# Patient Record
Sex: MALE | Race: WHITE | NOT HISPANIC OR LATINO | Employment: OTHER | URBAN - METROPOLITAN AREA
[De-identification: names, ages, dates, MRNs, and addresses within clinical notes are randomized per-mention and may not be internally consistent; named-entity substitution may affect disease eponyms.]

---

## 2020-11-06 ENCOUNTER — APPOINTMENT (OUTPATIENT)
Dept: GENERAL RADIOLOGY | Facility: CLINIC | Age: 25
End: 2020-11-06
Attending: EMERGENCY MEDICINE

## 2020-11-06 ENCOUNTER — HOSPITAL ENCOUNTER (EMERGENCY)
Facility: CLINIC | Age: 25
Discharge: ANOTHER HOSPITAL | End: 2020-11-06
Attending: EMERGENCY MEDICINE | Admitting: EMERGENCY MEDICINE

## 2020-11-06 VITALS
DIASTOLIC BLOOD PRESSURE: 119 MMHG | HEART RATE: 161 BPM | TEMPERATURE: 99.2 F | SYSTOLIC BLOOD PRESSURE: 169 MMHG | OXYGEN SATURATION: 98 %

## 2020-11-06 DIAGNOSIS — S11.93XA GUNSHOT WOUND OF NECK, INITIAL ENCOUNTER: ICD-10-CM

## 2020-11-06 LAB
ABO + RH BLD: NORMAL
ABO + RH BLD: NORMAL
ALBUMIN SERPL-MCNC: 3.3 G/DL (ref 3.4–5)
ALP SERPL-CCNC: 87 U/L (ref 40–150)
ALT SERPL W P-5'-P-CCNC: 24 U/L (ref 0–70)
ANION GAP SERPL CALCULATED.3IONS-SCNC: 13 MMOL/L (ref 3–14)
APTT PPP: 42 SEC (ref 22–37)
AST SERPL W P-5'-P-CCNC: 26 U/L (ref 0–45)
BILIRUB SERPL-MCNC: 1.6 MG/DL (ref 0.2–1.3)
BLD GP AB SCN SERPL QL: NORMAL
BLD PROD TYP BPU: NORMAL
BLD UNIT ID BPU: 0
BLOOD BANK CMNT PATIENT-IMP: NORMAL
BLOOD PRODUCT CODE: NORMAL
BPU ID: NORMAL
BUN SERPL-MCNC: 14 MG/DL (ref 7–30)
CALCIUM SERPL-MCNC: 9.1 MG/DL (ref 8.5–10.1)
CHLORIDE SERPL-SCNC: 100 MMOL/L (ref 94–109)
CO2 SERPL-SCNC: 25 MMOL/L (ref 20–32)
CREAT SERPL-MCNC: 0.97 MG/DL (ref 0.66–1.25)
ETHANOL SERPL-MCNC: <0.01 G/DL
GFR SERPL CREATININE-BSD FRML MDRD: >90 ML/MIN/{1.73_M2}
GLUCOSE SERPL-MCNC: 202 MG/DL (ref 70–99)
INR PPP: 1.05 (ref 0.86–1.14)
LACTATE BLD-SCNC: 6.4 MMOL/L (ref 0.7–2)
NUM BPU REQUESTED: 1
NUM BPU REQUESTED: 2
NUM BPU REQUESTED: 6
POTASSIUM SERPL-SCNC: 3.1 MMOL/L (ref 3.4–5.3)
PROT SERPL-MCNC: 6.8 G/DL (ref 6.8–8.8)
SODIUM SERPL-SCNC: 138 MMOL/L (ref 133–144)
SPECIMEN EXP DATE BLD: NORMAL
TRANSFUSION STATUS PATIENT QL: NORMAL

## 2020-11-06 PROCEDURE — 31605 EMER TRACHEOSTOMY CTHYR MEM: CPT

## 2020-11-06 PROCEDURE — 86923 COMPATIBILITY TEST ELECTRIC: CPT | Performed by: EMERGENCY MEDICINE

## 2020-11-06 PROCEDURE — 85025 COMPLETE CBC W/AUTO DIFF WBC: CPT | Performed by: EMERGENCY MEDICINE

## 2020-11-06 PROCEDURE — 999N000065 XR CHEST PORT 1 VW

## 2020-11-06 PROCEDURE — 85730 THROMBOPLASTIN TIME PARTIAL: CPT | Performed by: EMERGENCY MEDICINE

## 2020-11-06 PROCEDURE — 86850 RBC ANTIBODY SCREEN: CPT | Performed by: EMERGENCY MEDICINE

## 2020-11-06 PROCEDURE — 36430 TRANSFUSION BLD/BLD COMPNT: CPT

## 2020-11-06 PROCEDURE — 86900 BLOOD TYPING SEROLOGIC ABO: CPT | Performed by: EMERGENCY MEDICINE

## 2020-11-06 PROCEDURE — 99204 OFFICE O/P NEW MOD 45 MIN: CPT | Performed by: SURGERY

## 2020-11-06 PROCEDURE — 31500 INSERT EMERGENCY AIRWAY: CPT

## 2020-11-06 PROCEDURE — 85610 PROTHROMBIN TIME: CPT | Performed by: EMERGENCY MEDICINE

## 2020-11-06 PROCEDURE — P9059 PLASMA, FRZ BETWEEN 8-24HOUR: HCPCS | Performed by: EMERGENCY MEDICINE

## 2020-11-06 PROCEDURE — 80053 COMPREHEN METABOLIC PANEL: CPT | Performed by: EMERGENCY MEDICINE

## 2020-11-06 PROCEDURE — 250N000011 HC RX IP 250 OP 636: Performed by: EMERGENCY MEDICINE

## 2020-11-06 PROCEDURE — 250N000011 HC RX IP 250 OP 636

## 2020-11-06 PROCEDURE — 999N001063 HC STATISTIC THAWING COMPONENT: Performed by: EMERGENCY MEDICINE

## 2020-11-06 PROCEDURE — 96365 THER/PROPH/DIAG IV INF INIT: CPT

## 2020-11-06 PROCEDURE — 83605 ASSAY OF LACTIC ACID: CPT | Performed by: EMERGENCY MEDICINE

## 2020-11-06 PROCEDURE — P9037 PLATE PHERES LEUKOREDU IRRAD: HCPCS | Performed by: EMERGENCY MEDICINE

## 2020-11-06 PROCEDURE — 99291 CRITICAL CARE FIRST HOUR: CPT | Mod: 25

## 2020-11-06 PROCEDURE — P9016 RBC LEUKOCYTES REDUCED: HCPCS | Performed by: EMERGENCY MEDICINE

## 2020-11-06 PROCEDURE — 999N000186 HC STATISTIC TRAUMA - NO PRIOR

## 2020-11-06 PROCEDURE — 86901 BLOOD TYPING SEROLOGIC RH(D): CPT | Performed by: EMERGENCY MEDICINE

## 2020-11-06 PROCEDURE — 80320 DRUG SCREEN QUANTALCOHOLS: CPT | Performed by: EMERGENCY MEDICINE

## 2020-11-06 PROCEDURE — 250N000009 HC RX 250: Performed by: EMERGENCY MEDICINE

## 2020-11-06 RX ORDER — PROPOFOL 10 MG/ML
5-75 INJECTION, EMULSION INTRAVENOUS CONTINUOUS
Status: DISCONTINUED | OUTPATIENT
Start: 2020-11-06 | End: 2020-11-06 | Stop reason: HOSPADM

## 2020-11-06 RX ORDER — PROPOFOL 10 MG/ML
INJECTION, EMULSION INTRAVENOUS
Status: COMPLETED
Start: 2020-11-06 | End: 2020-11-06

## 2020-11-06 RX ORDER — ROCURONIUM BROMIDE 50 MG/5 ML
70 SYRINGE (ML) INTRAVENOUS ONCE
Status: COMPLETED | OUTPATIENT
Start: 2020-11-06 | End: 2020-11-06

## 2020-11-06 RX ADMIN — PROPOFOL 30 MCG/KG/MIN: 10 INJECTION, EMULSION INTRAVENOUS at 18:40

## 2020-11-06 RX ADMIN — Medication 100 MG: at 18:21

## 2020-11-06 RX ADMIN — Medication 70 MG: at 18:40

## 2020-11-06 RX ADMIN — KETAMINE HYDROCHLORIDE 100 MG: 10 INJECTION, SOLUTION INTRAMUSCULAR; INTRAVENOUS at 18:28

## 2020-11-06 RX ADMIN — Medication 100 MG: at 18:14

## 2020-11-06 RX ADMIN — SUCCINYLCHOLINE CHLORIDE 100 MG: 20 INJECTION, SOLUTION INTRAMUSCULAR; INTRAVENOUS at 18:58

## 2020-11-06 ASSESSMENT — ENCOUNTER SYMPTOMS
SHORTNESS OF BREATH: 0
HEADACHES: 0
WOUND: 1
ABDOMINAL PAIN: 0

## 2020-11-07 NOTE — ED PROVIDER NOTES
History     Chief Complaint:  No chief complaint on file.      KAREN Yoder is a 25 year old male who presents with ***    Allergies:  No Known Drug Allergies    Medications:    The patient is not currently taking any prescribed medications.    Past Medical History:    Depression with suicidal ideation  Suicide attempt by drug ingestion  Conduct disorder  Substance abuse  Psychoactive substance-induced organic mood disorder    Past Surgical History:    Tonsillectomy    Family History:    History reviewed. No pertinent family history.     Social History:  The patient was accompanied to the ED by EMS.  Smoking Status: Former smoker  Smokeless Tobacco: Never used  Alcohol Use: Yes  Drug Use: Yes - marijuana  Marital Status:  Single    Review of Systems  ***  Physical Exam     Patient Vitals for the past 24 hrs:   BP Pulse SpO2   11/06/20 1810 -- -- 100 %   11/06/20 1809 -- -- 100 %   11/06/20 1808 (!) 122/96 139 100 %       Physical Exam  ***    Emergency Department Course     ECG:  ***     Imaging:  Radiology findings were communicated with the {Patient/MD:[id number]} who voiced understanding of the findings.    No orders to display       Laboratory:  Laboratory findings were communicated with the {Patient/MD:[id number]} who voiced understanding of the findings.    ***     Procedures  ***    Interventions:  ***    Medications   propofol (DIPRIVAN) 1000 MG/100ML infusion (has no administration in time range)       Emergency Department Course:  Past medical records, nursing notes, and vitals reviewed.    *** I performed an exam of the patient as documented above.     ***EKG obtained in the ED, see results above.   ***IV was inserted and blood was drawn for laboratory testing, results above.  ***The patient provided a urine sample here in the emergency department. This was sent for laboratory testing, findings above.  ***The patient was sent for a *** while in the emergency department, results above.     *** I  "rechecked the patient and discussed the results of his workup thus far.     {edcdispo:003529}    I personally reviewed the laboratory *** and imaging results with the {PATIENT, FAMILY MEMBER, CAREGIVER:[id number]} and answered all related questions prior to {kldischadmittrans:391997}.     Impression & Plan     ***     {[facility] Quality Projects:[id number]}    {trauma activation?:294517::\" \"}    CMS Diagnoses: {Sepsis/Septic Shock/Stemi/Stroke:025700::\" \"}    Medical Decision Making:  ***     Critical Care Time: was *** minutes for this patient excluding procedures    Diagnosis:  No diagnosis found.    Disposition:  {kldisposition:164798}    Discharge Medications:  New Prescriptions    No medications on file       ***  11/6/2020   [facility] EMERGENCY DEPT    "
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
## 2020-11-07 NOTE — ED NOTES
Assisted PT. Onto bed in triage. Brought Pt. Back to ER room. Applied monitoring equipment onto Patient (Pulse ox and BP).
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
## 2020-11-09 LAB
BASOPHILS # BLD AUTO: 0.1 10E9/L (ref 0–0.2)
BASOPHILS NFR BLD AUTO: 0.6 %
DIFFERENTIAL METHOD BLD: ABNORMAL
EOSINOPHIL # BLD AUTO: 0.3 10E9/L (ref 0–0.7)
EOSINOPHIL NFR BLD AUTO: 2.3 %
ERYTHROCYTE [DISTWIDTH] IN BLOOD BY AUTOMATED COUNT: 12 % (ref 10–15)
HCT VFR BLD AUTO: 38.3 % (ref 40–53)
HGB BLD-MCNC: 11.9 G/DL (ref 13.3–17.7)
IMM GRANULOCYTES # BLD: 0.1 10E9/L (ref 0–0.4)
IMM GRANULOCYTES NFR BLD: 0.4 %
LYMPHOCYTES # BLD AUTO: 6 10E9/L (ref 0.8–5.3)
LYMPHOCYTES NFR BLD AUTO: 48.7 %
MCH RBC QN AUTO: 28.4 PG (ref 26.5–33)
MCHC RBC AUTO-ENTMCNC: 31.1 G/DL (ref 31.5–36.5)
MCV RBC AUTO: 91 FL (ref 78–100)
MONOCYTES # BLD AUTO: 0.8 10E9/L (ref 0–1.3)
MONOCYTES NFR BLD AUTO: 6.4 %
NEUTROPHILS # BLD AUTO: 5.1 10E9/L (ref 1.6–8.3)
NEUTROPHILS NFR BLD AUTO: 41.6 %
NRBC # BLD AUTO: 0 10*3/UL
NRBC BLD AUTO-RTO: 0 /100
PLATELET # BLD AUTO: 276 10E9/L (ref 150–450)
PLATELET # BLD EST: ABNORMAL 10*3/UL
RBC # BLD AUTO: 4.19 10E12/L (ref 4.4–5.9)
RBC MORPH BLD: ABNORMAL
WBC # BLD AUTO: 12.2 10E9/L (ref 4–11)

## 2021-07-17 ENCOUNTER — HOSPITAL ENCOUNTER (INPATIENT)
Facility: CLINIC | Age: 26
LOS: 3 days | Discharge: LEFT BEFORE DISCHARGE | End: 2021-07-20
Attending: INTERNAL MEDICINE | Admitting: INTERNAL MEDICINE
Payer: COMMERCIAL

## 2021-07-17 ENCOUNTER — TRANSFERRED RECORDS (OUTPATIENT)
Dept: HEALTH INFORMATION MANAGEMENT | Facility: CLINIC | Age: 26
End: 2021-07-17

## 2021-07-17 PROBLEM — T40.2X1A OPIOID OVERDOSE (H): Status: ACTIVE | Noted: 2021-07-17

## 2021-07-17 LAB
ALT SERPL-CCNC: 22 U/L (ref 14–63)
AST SERPL-CCNC: 19 U/L (ref 15–37)
CREAT SERPL-MCNC: 1.07 MG/DL (ref 0.66–1.25)
CREATININE (EXTERNAL): 1.16 MG/DL (ref 0.67–1.17)
GFR ESTIMATED (EXTERNAL): >60 ML/MIN/1.73M2
GFR SERPL CREATININE-BSD FRML MDRD: >90 ML/MIN/1.73M2
GLUCOSE (EXTERNAL): 75 MG/DL (ref 74–100)
GLUCOSE BLDC GLUCOMTR-MCNC: 84 MG/DL (ref 70–99)
POTASSIUM (EXTERNAL): 2.8 MMOL/L (ref 3.5–5.1)
POTASSIUM BLD-SCNC: 3.5 MMOL/L (ref 3.4–5.3)

## 2021-07-17 PROCEDURE — 3E033XZ INTRODUCTION OF VASOPRESSOR INTO PERIPHERAL VEIN, PERCUTANEOUS APPROACH: ICD-10-PCS | Performed by: INTERNAL MEDICINE

## 2021-07-17 PROCEDURE — 258N000003 HC RX IP 258 OP 636: Performed by: INTERNAL MEDICINE

## 2021-07-17 PROCEDURE — 82565 ASSAY OF CREATININE: CPT | Performed by: INTERNAL MEDICINE

## 2021-07-17 PROCEDURE — 84132 ASSAY OF SERUM POTASSIUM: CPT | Performed by: INTERNAL MEDICINE

## 2021-07-17 PROCEDURE — 99223 1ST HOSP IP/OBS HIGH 75: CPT | Mod: AI | Performed by: INTERNAL MEDICINE

## 2021-07-17 PROCEDURE — 36415 COLL VENOUS BLD VENIPUNCTURE: CPT | Performed by: INTERNAL MEDICINE

## 2021-07-17 PROCEDURE — 250N000011 HC RX IP 250 OP 636: Performed by: INTERNAL MEDICINE

## 2021-07-17 PROCEDURE — 200N000001 HC R&B ICU

## 2021-07-17 PROCEDURE — C9113 INJ PANTOPRAZOLE SODIUM, VIA: HCPCS | Performed by: INTERNAL MEDICINE

## 2021-07-17 RX ORDER — AMOXICILLIN 250 MG
2 CAPSULE ORAL 2 TIMES DAILY PRN
Status: DISCONTINUED | OUTPATIENT
Start: 2021-07-17 | End: 2021-07-17

## 2021-07-17 RX ORDER — BUPRENORPHINE AND NALOXONE 2; .5 MG/1; MG/1
.25-1 FILM, SOLUBLE BUCCAL; SUBLINGUAL 2 TIMES DAILY
COMMUNITY

## 2021-07-17 RX ORDER — LIDOCAINE 40 MG/G
CREAM TOPICAL
Status: DISCONTINUED | OUTPATIENT
Start: 2021-07-17 | End: 2021-07-20 | Stop reason: HOSPADM

## 2021-07-17 RX ORDER — ONDANSETRON 2 MG/ML
4 INJECTION INTRAMUSCULAR; INTRAVENOUS EVERY 6 HOURS PRN
Status: DISCONTINUED | OUTPATIENT
Start: 2021-07-17 | End: 2021-07-17

## 2021-07-17 RX ORDER — AMOXICILLIN 250 MG
1 CAPSULE ORAL 2 TIMES DAILY PRN
Status: DISCONTINUED | OUTPATIENT
Start: 2021-07-17 | End: 2021-07-20 | Stop reason: HOSPADM

## 2021-07-17 RX ORDER — POLYETHYLENE GLYCOL 3350 17 G/17G
17 POWDER, FOR SOLUTION ORAL DAILY PRN
Status: DISCONTINUED | OUTPATIENT
Start: 2021-07-17 | End: 2021-07-20 | Stop reason: HOSPADM

## 2021-07-17 RX ORDER — NITROGLYCERIN 0.4 MG/1
0.4 TABLET SUBLINGUAL EVERY 5 MIN PRN
Status: DISCONTINUED | OUTPATIENT
Start: 2021-07-17 | End: 2021-07-20 | Stop reason: HOSPADM

## 2021-07-17 RX ORDER — AMOXICILLIN 250 MG
2 CAPSULE ORAL 2 TIMES DAILY PRN
Status: DISCONTINUED | OUTPATIENT
Start: 2021-07-17 | End: 2021-07-20 | Stop reason: HOSPADM

## 2021-07-17 RX ORDER — PROCHLORPERAZINE 25 MG
25 SUPPOSITORY, RECTAL RECTAL EVERY 12 HOURS PRN
Status: DISCONTINUED | OUTPATIENT
Start: 2021-07-17 | End: 2021-07-20 | Stop reason: HOSPADM

## 2021-07-17 RX ORDER — CLONAZEPAM 1 MG/1
1 TABLET ORAL 3 TIMES DAILY PRN
COMMUNITY

## 2021-07-17 RX ORDER — PROCHLORPERAZINE MALEATE 10 MG
10 TABLET ORAL EVERY 6 HOURS PRN
Status: DISCONTINUED | OUTPATIENT
Start: 2021-07-17 | End: 2021-07-17

## 2021-07-17 RX ORDER — ONDANSETRON 2 MG/ML
4 INJECTION INTRAMUSCULAR; INTRAVENOUS EVERY 6 HOURS PRN
Status: DISCONTINUED | OUTPATIENT
Start: 2021-07-17 | End: 2021-07-20 | Stop reason: HOSPADM

## 2021-07-17 RX ORDER — DEXTROSE MONOHYDRATE 25 G/50ML
25-50 INJECTION, SOLUTION INTRAVENOUS
Status: DISCONTINUED | OUTPATIENT
Start: 2021-07-17 | End: 2021-07-20 | Stop reason: HOSPADM

## 2021-07-17 RX ORDER — DIAZEPAM 5 MG
5 TABLET ORAL 3 TIMES DAILY
COMMUNITY

## 2021-07-17 RX ORDER — ONDANSETRON 4 MG/1
4 TABLET, ORALLY DISINTEGRATING ORAL EVERY 6 HOURS PRN
Status: DISCONTINUED | OUTPATIENT
Start: 2021-07-17 | End: 2021-07-20 | Stop reason: HOSPADM

## 2021-07-17 RX ORDER — ONDANSETRON 4 MG/1
4 TABLET, ORALLY DISINTEGRATING ORAL EVERY 6 HOURS PRN
Status: DISCONTINUED | OUTPATIENT
Start: 2021-07-17 | End: 2021-07-17

## 2021-07-17 RX ORDER — NICOTINE POLACRILEX 4 MG
15-30 LOZENGE BUCCAL
Status: DISCONTINUED | OUTPATIENT
Start: 2021-07-17 | End: 2021-07-20 | Stop reason: HOSPADM

## 2021-07-17 RX ORDER — PROCHLORPERAZINE 25 MG
25 SUPPOSITORY, RECTAL RECTAL EVERY 12 HOURS PRN
Status: DISCONTINUED | OUTPATIENT
Start: 2021-07-17 | End: 2021-07-17

## 2021-07-17 RX ORDER — DEXTROSE MONOHYDRATE, SODIUM CHLORIDE, AND POTASSIUM CHLORIDE 50; 1.49; 4.5 G/1000ML; G/1000ML; G/1000ML
INJECTION, SOLUTION INTRAVENOUS CONTINUOUS
Status: DISCONTINUED | OUTPATIENT
Start: 2021-07-17 | End: 2021-07-20

## 2021-07-17 RX ORDER — PROCHLORPERAZINE MALEATE 5 MG
10 TABLET ORAL EVERY 6 HOURS PRN
Status: DISCONTINUED | OUTPATIENT
Start: 2021-07-17 | End: 2021-07-20 | Stop reason: HOSPADM

## 2021-07-17 RX ORDER — LIDOCAINE 40 MG/G
CREAM TOPICAL
Status: DISCONTINUED | OUTPATIENT
Start: 2021-07-17 | End: 2021-07-17

## 2021-07-17 RX ORDER — AMOXICILLIN 250 MG
1 CAPSULE ORAL 2 TIMES DAILY PRN
Status: DISCONTINUED | OUTPATIENT
Start: 2021-07-17 | End: 2021-07-17

## 2021-07-17 RX ADMIN — PANTOPRAZOLE SODIUM 40 MG: 40 INJECTION, POWDER, FOR SOLUTION INTRAVENOUS at 21:56

## 2021-07-17 RX ADMIN — POTASSIUM CHLORIDE, DEXTROSE MONOHYDRATE AND SODIUM CHLORIDE: 150; 5; 450 INJECTION, SOLUTION INTRAVENOUS at 20:12

## 2021-07-17 ASSESSMENT — MIFFLIN-ST. JEOR: SCORE: 1634.25

## 2021-07-17 ASSESSMENT — ACTIVITIES OF DAILY LIVING (ADL): ADLS_ACUITY_SCORE: 15

## 2021-07-18 ENCOUNTER — APPOINTMENT (OUTPATIENT)
Dept: GENERAL RADIOLOGY | Facility: CLINIC | Age: 26
End: 2021-07-18
Attending: INTERNAL MEDICINE
Payer: COMMERCIAL

## 2021-07-18 LAB
ALBUMIN SERPL-MCNC: 2.9 G/DL (ref 3.4–5)
ALP SERPL-CCNC: 86 U/L (ref 40–150)
ALT SERPL W P-5'-P-CCNC: 44 U/L (ref 0–70)
ANION GAP SERPL CALCULATED.3IONS-SCNC: 7 MMOL/L (ref 3–14)
AST SERPL W P-5'-P-CCNC: 39 U/L (ref 0–45)
BASOPHILS # BLD MANUAL: 0 10E3/UL (ref 0–0.2)
BASOPHILS NFR BLD MANUAL: 0 %
BILIRUB SERPL-MCNC: 1.7 MG/DL (ref 0.2–1.3)
BUN SERPL-MCNC: 16 MG/DL (ref 7–30)
CALCIUM SERPL-MCNC: 7.4 MG/DL (ref 8.5–10.1)
CHLORIDE BLD-SCNC: 108 MMOL/L (ref 94–109)
CO2 SERPL-SCNC: 23 MMOL/L (ref 20–32)
CREAT SERPL-MCNC: 1.11 MG/DL (ref 0.66–1.25)
EOSINOPHIL # BLD MANUAL: 0.4 10E3/UL (ref 0–0.7)
EOSINOPHIL NFR BLD MANUAL: 2 %
ERYTHROCYTE [DISTWIDTH] IN BLOOD BY AUTOMATED COUNT: 11.9 % (ref 10–15)
GFR SERPL CREATININE-BSD FRML MDRD: >90 ML/MIN/1.73M2
GLUCOSE BLD-MCNC: 114 MG/DL (ref 70–99)
GLUCOSE BLDC GLUCOMTR-MCNC: 132 MG/DL (ref 70–99)
HCT VFR BLD AUTO: 33.5 % (ref 40–53)
HGB BLD-MCNC: 12 G/DL (ref 13.3–17.7)
LYMPHOCYTES # BLD MANUAL: 1.6 10E3/UL (ref 0.8–5.3)
LYMPHOCYTES NFR BLD MANUAL: 8 %
MCH RBC QN AUTO: 29.3 PG (ref 26.5–33)
MCHC RBC AUTO-ENTMCNC: 35.8 G/DL (ref 31.5–36.5)
MCV RBC AUTO: 82 FL (ref 78–100)
MONOCYTES # BLD MANUAL: 0.6 10E3/UL (ref 0–1.3)
MONOCYTES NFR BLD MANUAL: 3 %
NEUTROPHILS # BLD MANUAL: 17.8 10E3/UL (ref 1.6–8.3)
NEUTROPHILS NFR BLD MANUAL: 87 %
PLAT MORPH BLD: ABNORMAL
PLATELET # BLD AUTO: 111 10E3/UL (ref 150–450)
POTASSIUM BLD-SCNC: 3.9 MMOL/L (ref 3.4–5.3)
PROT SERPL-MCNC: 5.7 G/DL (ref 6.8–8.8)
RBC # BLD AUTO: 4.09 10E6/UL (ref 4.4–5.9)
RBC MORPH BLD: ABNORMAL
SODIUM SERPL-SCNC: 138 MMOL/L (ref 133–144)
WBC # BLD AUTO: 20.5 10E3/UL (ref 4–11)

## 2021-07-18 PROCEDURE — C9113 INJ PANTOPRAZOLE SODIUM, VIA: HCPCS | Performed by: INTERNAL MEDICINE

## 2021-07-18 PROCEDURE — 258N000003 HC RX IP 258 OP 636: Performed by: INTERNAL MEDICINE

## 2021-07-18 PROCEDURE — 99291 CRITICAL CARE FIRST HOUR: CPT | Performed by: STUDENT IN AN ORGANIZED HEALTH CARE EDUCATION/TRAINING PROGRAM

## 2021-07-18 PROCEDURE — 250N000011 HC RX IP 250 OP 636: Performed by: STUDENT IN AN ORGANIZED HEALTH CARE EDUCATION/TRAINING PROGRAM

## 2021-07-18 PROCEDURE — 36415 COLL VENOUS BLD VENIPUNCTURE: CPT | Performed by: INTERNAL MEDICINE

## 2021-07-18 PROCEDURE — 200N000001 HC R&B ICU

## 2021-07-18 PROCEDURE — 71045 X-RAY EXAM CHEST 1 VIEW: CPT

## 2021-07-18 PROCEDURE — 93005 ELECTROCARDIOGRAM TRACING: CPT

## 2021-07-18 PROCEDURE — 250N000009 HC RX 250: Performed by: INTERNAL MEDICINE

## 2021-07-18 PROCEDURE — 87040 BLOOD CULTURE FOR BACTERIA: CPT | Performed by: INTERNAL MEDICINE

## 2021-07-18 PROCEDURE — 85014 HEMATOCRIT: CPT | Performed by: INTERNAL MEDICINE

## 2021-07-18 PROCEDURE — 82040 ASSAY OF SERUM ALBUMIN: CPT | Performed by: INTERNAL MEDICINE

## 2021-07-18 PROCEDURE — 250N000013 HC RX MED GY IP 250 OP 250 PS 637: Performed by: STUDENT IN AN ORGANIZED HEALTH CARE EDUCATION/TRAINING PROGRAM

## 2021-07-18 PROCEDURE — 258N000003 HC RX IP 258 OP 636: Performed by: STUDENT IN AN ORGANIZED HEALTH CARE EDUCATION/TRAINING PROGRAM

## 2021-07-18 PROCEDURE — 250N000011 HC RX IP 250 OP 636: Performed by: INTERNAL MEDICINE

## 2021-07-18 PROCEDURE — 250N000013 HC RX MED GY IP 250 OP 250 PS 637: Performed by: INTERNAL MEDICINE

## 2021-07-18 PROCEDURE — 87389 HIV-1 AG W/HIV-1&-2 AB AG IA: CPT | Performed by: INTERNAL MEDICINE

## 2021-07-18 RX ORDER — BUPRENORPHINE AND NALOXONE 2; .5 MG/1; MG/1
0.25 FILM, SOLUBLE BUCCAL; SUBLINGUAL 2 TIMES DAILY
Status: DISCONTINUED | OUTPATIENT
Start: 2021-07-18 | End: 2021-07-20 | Stop reason: HOSPADM

## 2021-07-18 RX ORDER — ROPIVACAINE IN 0.9% SOD CHL/PF 0.1 %
.03-.125 PLASTIC BAG, INJECTION (ML) EPIDURAL CONTINUOUS
Status: DISCONTINUED | OUTPATIENT
Start: 2021-07-18 | End: 2021-07-19

## 2021-07-18 RX ORDER — PIPERACILLIN SODIUM, TAZOBACTAM SODIUM 3; .375 G/15ML; G/15ML
3.38 INJECTION, POWDER, LYOPHILIZED, FOR SOLUTION INTRAVENOUS EVERY 6 HOURS
Status: DISCONTINUED | OUTPATIENT
Start: 2021-07-18 | End: 2021-07-20

## 2021-07-18 RX ORDER — ACETAMINOPHEN 650 MG/1
650 SUPPOSITORY RECTAL EVERY 4 HOURS PRN
Status: DISCONTINUED | OUTPATIENT
Start: 2021-07-18 | End: 2021-07-20 | Stop reason: HOSPADM

## 2021-07-18 RX ORDER — DIAZEPAM 10 MG/2ML
5 INJECTION, SOLUTION INTRAMUSCULAR; INTRAVENOUS ONCE
Status: COMPLETED | OUTPATIENT
Start: 2021-07-18 | End: 2021-07-18

## 2021-07-18 RX ORDER — DIAZEPAM 5 MG
5 TABLET ORAL 3 TIMES DAILY
Status: DISCONTINUED | OUTPATIENT
Start: 2021-07-18 | End: 2021-07-20 | Stop reason: HOSPADM

## 2021-07-18 RX ORDER — ACETAMINOPHEN 325 MG/1
650 TABLET ORAL ONCE
Status: COMPLETED | OUTPATIENT
Start: 2021-07-18 | End: 2021-07-18

## 2021-07-18 RX ORDER — ACETAMINOPHEN 325 MG/1
650 TABLET ORAL EVERY 4 HOURS PRN
Status: DISCONTINUED | OUTPATIENT
Start: 2021-07-18 | End: 2021-07-20 | Stop reason: HOSPADM

## 2021-07-18 RX ORDER — SODIUM CHLORIDE 9 MG/ML
INJECTION, SOLUTION INTRAVENOUS CONTINUOUS
Status: DISCONTINUED | OUTPATIENT
Start: 2021-07-18 | End: 2021-07-20 | Stop reason: HOSPADM

## 2021-07-18 RX ADMIN — PIPERACILLIN SODIUM AND TAZOBACTAM SODIUM 3.38 G: 3; .375 INJECTION, POWDER, LYOPHILIZED, FOR SOLUTION INTRAVENOUS at 07:32

## 2021-07-18 RX ADMIN — PIPERACILLIN SODIUM AND TAZOBACTAM SODIUM 3.38 G: 3; .375 INJECTION, POWDER, LYOPHILIZED, FOR SOLUTION INTRAVENOUS at 23:53

## 2021-07-18 RX ADMIN — PIPERACILLIN SODIUM AND TAZOBACTAM SODIUM 3.38 G: 3; .375 INJECTION, POWDER, LYOPHILIZED, FOR SOLUTION INTRAVENOUS at 17:36

## 2021-07-18 RX ADMIN — PANTOPRAZOLE SODIUM 40 MG: 40 INJECTION, POWDER, FOR SOLUTION INTRAVENOUS at 09:20

## 2021-07-18 RX ADMIN — BUPRENORPHINE HYDROCHLORIDE, NALOXONE HYDROCHLORIDE 0.25 FILM: 2; .5 FILM, SOLUBLE BUCCAL; SUBLINGUAL at 20:22

## 2021-07-18 RX ADMIN — SODIUM CHLORIDE: 9 INJECTION, SOLUTION INTRAVENOUS at 18:59

## 2021-07-18 RX ADMIN — PIPERACILLIN SODIUM AND TAZOBACTAM SODIUM 3.38 G: 3; .375 INJECTION, POWDER, LYOPHILIZED, FOR SOLUTION INTRAVENOUS at 13:35

## 2021-07-18 RX ADMIN — PANTOPRAZOLE SODIUM 40 MG: 40 INJECTION, POWDER, FOR SOLUTION INTRAVENOUS at 20:21

## 2021-07-18 RX ADMIN — Medication 0.03 MCG/KG/MIN: at 06:30

## 2021-07-18 RX ADMIN — SODIUM CHLORIDE 1000 ML: 9 INJECTION, SOLUTION INTRAVENOUS at 04:30

## 2021-07-18 RX ADMIN — ONDANSETRON 4 MG: 2 INJECTION INTRAMUSCULAR; INTRAVENOUS at 00:38

## 2021-07-18 RX ADMIN — ACETAMINOPHEN 650 MG: 325 TABLET, FILM COATED ORAL at 23:58

## 2021-07-18 RX ADMIN — VANCOMYCIN HYDROCHLORIDE 1500 MG: 5 INJECTION, POWDER, LYOPHILIZED, FOR SOLUTION INTRAVENOUS at 08:43

## 2021-07-18 RX ADMIN — DIAZEPAM 5 MG: 5 TABLET ORAL at 17:33

## 2021-07-18 RX ADMIN — POTASSIUM CHLORIDE, DEXTROSE MONOHYDRATE AND SODIUM CHLORIDE: 150; 5; 450 INJECTION, SOLUTION INTRAVENOUS at 20:01

## 2021-07-18 RX ADMIN — PROCHLORPERAZINE EDISYLATE 10 MG: 5 INJECTION INTRAMUSCULAR; INTRAVENOUS at 04:42

## 2021-07-18 RX ADMIN — ACETAMINOPHEN 650 MG: 325 TABLET, FILM COATED ORAL at 20:21

## 2021-07-18 RX ADMIN — DIAZEPAM 5 MG: 5 INJECTION, SOLUTION INTRAMUSCULAR; INTRAVENOUS at 06:58

## 2021-07-18 RX ADMIN — ACETAMINOPHEN 650 MG: 325 TABLET, FILM COATED ORAL at 09:19

## 2021-07-18 RX ADMIN — SODIUM CHLORIDE 1000 ML: 9 INJECTION, SOLUTION INTRAVENOUS at 07:50

## 2021-07-18 RX ADMIN — VANCOMYCIN HYDROCHLORIDE 750 MG: 1 INJECTION, POWDER, LYOPHILIZED, FOR SOLUTION INTRAVENOUS at 20:40

## 2021-07-18 RX ADMIN — DIAZEPAM 5 MG: 5 TABLET ORAL at 21:13

## 2021-07-18 RX ADMIN — BUPRENORPHINE HYDROCHLORIDE, NALOXONE HYDROCHLORIDE 0.25 FILM: 2; .5 FILM, SOLUBLE BUCCAL; SUBLINGUAL at 10:29

## 2021-07-18 RX ADMIN — ACETAMINOPHEN 650 MG: 325 TABLET, FILM COATED ORAL at 02:11

## 2021-07-18 RX ADMIN — POTASSIUM CHLORIDE, DEXTROSE MONOHYDRATE AND SODIUM CHLORIDE: 150; 5; 450 INJECTION, SOLUTION INTRAVENOUS at 10:12

## 2021-07-18 RX ADMIN — SODIUM CHLORIDE, POTASSIUM CHLORIDE, SODIUM LACTATE AND CALCIUM CHLORIDE 1000 ML: 600; 310; 30; 20 INJECTION, SOLUTION INTRAVENOUS at 10:21

## 2021-07-18 RX ADMIN — SODIUM CHLORIDE 1000 ML: 9 INJECTION, SOLUTION INTRAVENOUS at 02:10

## 2021-07-18 RX ADMIN — ACETAMINOPHEN 650 MG: 325 TABLET, FILM COATED ORAL at 17:35

## 2021-07-18 ASSESSMENT — ACTIVITIES OF DAILY LIVING (ADL)
ADLS_ACUITY_SCORE: 14

## 2021-07-18 ASSESSMENT — MIFFLIN-ST. JEOR: SCORE: 1637.25

## 2021-07-18 NOTE — PHARMACY-ADMISSION MEDICATION HISTORY
Pharmacy Medication History  Admission medication history interview status for the 7/17/2021  admission is complete. See EPIC admission navigator for prior to admission medications     Location of Interview: Patient room  Medication history sources: Patient, Surescripts and Care Everywhere    Significant changes made to the medication list:  Added all meds to list    In the past week, patient estimated taking medication this percent of the time: greater than 90%    Additional medication history information:   none    Medication reconciliation completed by provider prior to medication history? No    Time spent in this activity: 20 minutes    Prior to Admission medications    Medication Sig Last Dose Taking? Auth Provider   buprenorphine HCl-naloxone HCl (SUBOXONE) 2-0.5 MG per film Place 0.25-1 Film under the tongue 2 times daily 7/17/2021 at am Yes Unknown, Entered By History   clonazePAM (KLONOPIN) 1 MG tablet Take 1 mg by mouth 3 times daily as needed prn Yes Unknown, Entered By History   diazepam (VALIUM) 5 MG tablet Take 5 mg by mouth 3 times daily 7/17/2021 at am Yes Unknown, Entered By History   NONFORMULARY Multiple OTC Herbal supplements including: Valerian, blend for adrenal gland function Past Week at Unknown time Yes Unknown, Entered By History       The information provided in this note is only as accurate as the sources available at the time of update(s)

## 2021-07-18 NOTE — PROGRESS NOTES
"Pt resting in between cares. Levo gtt @0.05 to maintain MAP >65. Pt awakens and follows all commands. No further c/o neck pain or headache. Photophobia persists. Afebrile. Pt states that he \"feels much better\".  Tolerating PO. No further episodes of N/V/D.   Skin inspection: intact. Left arm (self injection site) does not appear cellulitic or warm to the touch.   Pt is voiding in adequate amounts. csccrn  "

## 2021-07-18 NOTE — PHARMACY-VANCOMYCIN DOSING SERVICE
"Pharmacy Vancomycin Initial Note  Date of Service 2021  Patient's  1995  26 year old, male    Indication: Intra-abdominal infection    Current estimated CrCl = Estimated Creatinine Clearance: 93 mL/min (based on SCr of 1.11 mg/dL).    Creatinine for last 3 days  2021:  9:45 PM Creatinine 1.07 mg/dL  2021:  4:32 AM Creatinine 1.11 mg/dL    Recent Vancomycin Level(s) for last 3 days  No results found for requested labs within last 72 hours.      Vancomycin IV Administrations (past 72 hours)      No vancomycin orders with administrations in past 72 hours.                Nephrotoxins and other renal medications (From now, onward)    Start     Dose/Rate Route Frequency Ordered Stop    21 0700  vancomycin 1500 mg in 0.9% NaCl 250 ml intermittent infusion 1,500 mg      1,500 mg  over 90 Minutes Intravenous ONCE 21 0636      21 0630  norepinephrine (LEVOPHED) 4 mg in  mL PERIPHERAL infusion      0.03-0.125 mcg/kg/min × 63.8 kg  7.2-29.9 mL/hr  Intravenous CONTINUOUS 21 0611      21 0600  piperacillin-tazobactam (ZOSYN) 3.375 g vial to attach to  mL bag     Note to Pharmacy: For [facility], [facility] and [facility]: For Zosyn-naive patients, use the \"Zosyn initial dose + extended infusion\" order panel.    3.375 g  over 30 Minutes Intravenous EVERY 6 HOURS 21 0419            Contrast Orders - past 72 hours (72h ago, onward)    None          Loading dose: 1500 mg at 09:00 2021.  Regimen: 750 mg IV every 12 hours.  Start time: 08:40 on 2021  Exposure target: AUC24 (range)400-600 mg/L.hr   AUC24,ss: 406 mg/L.hr  Probability of AUC24 > 400: 52 %  Ctrough,ss: 12.4 mg/L  Probability of Ctrough,ss > 20: 15 %  Probability of nephrotoxicity (Lodise BREE ): 8 %          Plan:  1. Start vancomycin  750 mg IV q12h.   2. Vancomycin monitoring method: AUC  3. Vancomycin therapeutic monitoring goal: 400-600 mg*h/L  4. Pharmacy will check vancomycin levels as appropriate " in 1-3 Days.    5. Serum creatinine levels will be ordered a minimum of twice weekly.      Monet Cheng RPH

## 2021-07-18 NOTE — PLAN OF CARE
PT/OT: Pt arrives last PM to hospital. Still receiving workup. Pt with hypotension and on pressors this AM. Will defer therapy evals at this time.
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
## 2021-07-18 NOTE — PROGRESS NOTES
X-cover    Called re: hypotension. Was concern outside hospital for opioid overdose, utox there cleared. Here now with persistent hypotension. Has received 2L NS bolus without improvement. He is alert and oriented, no distress, very conversant. RRR, lungs clear, some abdominal tenderness. CXR checked and clear. Labs this am returned with WBC 20.5. on questioning pt states he injected normal saline into a vein recently. Site of injection looks ok (L upper arm)  - repeat 1L NS bolus  - blood cultures x 2  - vanco/zosyn  - start levophed  - intensivist consult  - resend UDS  - pt is on scheduled diazepam at home, appears to be withdrawing. With mental status clearing will resume this to avoid further withdrawal  - psychiatry consult    Micah Olivas M.D.      Rounding Update  Spoke with Intensivist who will assume care today. Hospitalist service will sign off.    Flavia Herrera DO

## 2021-07-18 NOTE — PROVIDER NOTIFICATION
Hospitalist paged regarding hypotension, pt has had poor appetite last couple days with vomiting and diarrhea yesterday. Can we try a bolus? Also, pt requesting tylenol for headache. Orders for 1 L NS bolus over 2hrs and one time dose of tylenol ordered.    Hospitalist notified that pt remains hypotensive and febrile post bolus. C-xray, CMP, AM labs, and zosyn added.     Hospitalist notified that pt continues to be hypotensive after second bolus. BC drawn, levo ordered, EKG, intensivist consult.

## 2021-07-18 NOTE — H&P
INTERNAL MEDICINE H&P    H&P dictated:  #[id number]    Steven Wayne Jr., MD  [phone] (p)  Text Page  Kimmy

## 2021-07-18 NOTE — H&P
[facility] Intensive Care Unit  History and Physical  July 18, 2021    Jose Yoder   MRN# [id number]   Age: 26 year old YOB: 1995     Date of Admission: 7/17/2021    Primary care provider: Ana Connor          Assessment and plan :   Jose Yoder is a 26 year old male with PMH of polysubstance use disorder (heroin, opioids) on suboxone, PTSD/depression/anxiety on diazepam admitted on 7/17/2021 for sepsis and opioid withdrawal .     My assessment and plan for this patient is as follows:    Neurology:   1. Opioid withdrawal, patient swallowed suboxone and it did not absorb sublingually  2. Diazepam withdrawal  3. Polysubstance use disorder, including heroin and opioids, on suboxone 0.5mg BID  4. PTSD/depression/anxiety, on diazepam 5mg TID  5. Headache  Plan  - drug screen pending  - continue PTA diazepam  - continue PTA suboxone  - stop naloxone drip  - PRN tylenol     Cardiovascular:   1. Hypotension 2/2 suspected sepsis, patient has received 3L of NS   2. Pressors - norepinephrine  Plan  - continue norepinephrine drip, wean as able  - give additional 1L NS  - consider PICC for central access    Pulmonary/Ventilator Management:   - no acute concerns  Plan  - monitor    GI/Nutrition:  1. Epigastric pain with n/v/d, improving - patient had copious non-bilious vomiting and diarrhea early morning Saturday, OSH CT negative for SBO, bedside RUQ U/S is unremarkable  2. Hyperbilirubinemia, Tbili 1.7  Plan  - PRN zofran   - Regular diet    Renal/fluids/electrolytes:   1. Hypokalemia 2/2 to GI losses, resolved  Plan  - daily bmp  - replete electrolytes as needed  - monitor kidney function     Infectious Disease:   1. Possible sepsis - hypotensive, WBC 20.5 with neutrophil predominance, patient injected NS into a vein recently  Plan  - Blood cultures x2 pending  - UA pending  - continue vancomycin + pip/tazo, narrow as BC results    Endocrine:   - no acute concerns  Plan  - monitor      Hematology/Oncology:   1. Leukocytosis likely 2/2 bacteremia   2. Mild anemia   Plan  - daily cbc    MSK/Rheum:  - no acute concerns   Plan  - PT/OT    IV/Access:   - PIV x2 in L AC    Prophylaxis:   DVT Prophylaxis: Pneumatic Compression Devices  GI Prophylaxis: Not indicated    Restraints: Restraints for medical healing needed: NO  Family update by me today: No     Lines: No erythema or discharge at entry site for No invasive lines  Current lines are required for patient management    Medications     dextrose 5% and 0.45% NaCl + KCl 20 mEq/L 100 mL/hr at 07/17/21 2012     naloxone (NARCAN) infusion ADULT - opioid reversal Stopped (07/17/21 1930)     norepinephrine 0.08 mcg/kg/min (07/18/21 0730)       sodium chloride 0.9%  1,000 mL Intravenous Once     diazepam  5 mg Oral TID     pantoprazole (PROTONIX) IV  40 mg Intravenous BID     piperacillin-tazobactam  3.375 g Intravenous Q6H     sodium chloride (PF)  3 mL Intracatheter Q8H     sodium chloride (PF)  3 mL Intracatheter Q8H     vancomycin  1,500 mg Intravenous Once       Data        Goals for the next 24 hours :      - continue antibiotics  - wean pressors         Chief Complaint/ Reason for ICU Admission and HPI     Admitted for : No chief complaint on file.    History obtained from Chart review and patient.  History of Present Illness: Jose Yoder is a 27yo man with history of opioid use disorder on suboxone, PTSD, depression/anxiety on diazepam who presented to the [place] ED with abdominal pain, nausea, vomiting, diarrhea. He swallowed his suboxone ~0100 Saturday and went into opioid withdrawal with copious non-bilious vomiting and diarrhea. In the ED he was tachycardic, cbc showed WBC 2.9, CMP showed hypokalemic hypochloremic metabolic alkalosis and elevated bilirubin to 4.0. UA negative. Utox positive for benzos and cannabis. CT abd negative for SBO. RUQ U/S showed pericholecystic fluid. He then developed pinpoint pupils and drowsiness concerning  for opioid overdose and was started on naloxine drip, he responded well. He was then transferred to the ICU.          Past Medical History:     Past Medical History:   Diagnosis Date     Depression      Substance abuse             Past Surgical History:     Past Surgical History:   Procedure Laterality Date     TONSILLECTOMY              Social History:     Social History     Socioeconomic History     Marital status: Single     Spouse name: Not on file     Number of children: Not on file     Years of education: Not on file     Highest education level: Not on file   Occupational History     Not on file   Tobacco Use     Smoking status: Former Smoker     Tobacco comment: E-cig   Substance and Sexual Activity     Alcohol use: Yes     Drug use: Yes     Types: Marijuana     Comment: xanax, nyquil, marijuana, denies IV drug use.     Sexual activity: Yes     Partners: Female, Male     Birth control/protection: Condom   Other Topics Concern     Not on file   Social History Narrative    Lives with mom.    Will be attending [employer]- 11th grader     Social Determinants of Health     Financial Resource Strain:      Difficulty of Paying Living Expenses:    Food Insecurity:      Worried About Running Out of Food in the Last Year:      Ran Out of Food in the Last Year:    Transportation Needs:      Lack of Transportation (Medical):      Lack of Transportation (Non-Medical):    Physical Activity:      Days of Exercise per Week:      Minutes of Exercise per Session:    Stress:      Feeling of Stress :    Social Connections:      Frequency of Communication with Friends and Family:      Frequency of Social Gatherings with Friends and Family:      Attends Restorationist Services:      Active Member of Clubs or Organizations:      Attends Club or Organization Meetings:      Marital Status:    Intimate Partner Violence:      Fear of Current or Ex-Partner:      Emotionally Abused:      Physically Abused:      Sexually Abused:       Smoking: No.   History   Smoking Status     Former Smoker   Smokeless Tobacco     Not on file     Comment: E-cig     Alcohol: Yes   Social History    Substance and Sexual Activity      Alcohol use: Yes    Drug:  Yes    History   Drug Use     Types: Marijuana     Comment: xanax, nyquil, marijuana, denies IV drug use.            Family History:     Family History   Problem Relation Age of Onset     Cancer Paternal Uncle         pancreatic cancer            Allergies:   Please see allergy list which was reviewed this admission.  All allergies reviewed and addressed         Medications:     Current Facility-Administered Medications   Medication     acetaminophen (TYLENOL) tablet 650 mg    Or     acetaminophen (TYLENOL) Suppository 650 mg     buprenorphine HCl-naloxone HCl (SUBOXONE) 2-0.5 MG per film 0.25 Film     dextrose 5% and 0.45% NaCl + KCl 20 mEq/L infusion     glucose gel 15-30 g    Or     dextrose 50 % injection 25-50 mL    Or     glucagon injection 1 mg     diazepam (VALIUM) tablet 5 mg     lactated ringers BOLUS 1,000 mL     lidocaine (LMX4) cream     lidocaine 1 % 0.1-1 mL     melatonin tablet 1 mg     nitroGLYcerin (NITROSTAT) sublingual tablet 0.4 mg     norepinephrine (LEVOPHED) 4 mg in  mL PERIPHERAL infusion     ondansetron (ZOFRAN-ODT) ODT tab 4 mg    Or     ondansetron (ZOFRAN) injection 4 mg     pantoprazole (PROTONIX) IV push injection 40 mg     piperacillin-tazobactam (ZOSYN) 3.375 g vial to attach to  mL bag     polyethylene glycol (MIRALAX) Packet 17 g     prochlorperazine (COMPAZINE) injection 10 mg    Or     prochlorperazine (COMPAZINE) tablet 10 mg    Or     prochlorperazine (COMPAZINE) suppository 25 mg     senna-docusate (SENOKOT-S/PERICOLACE) 8.6-50 MG per tablet 1 tablet    Or     senna-docusate (SENOKOT-S/PERICOLACE) 8.6-50 MG per tablet 2 tablet     sodium chloride (PF) 0.9% PF flush 3 mL     sodium chloride (PF) 0.9% PF flush 3 mL     sodium chloride (PF) 0.9% PF flush 3  mL     sodium chloride (PF) 0.9% PF flush 3 mL     vancomycin (VANCOCIN) 750 mg in sodium chloride 0.9 % 250 mL intermittent infusion            Review of Systems:   The Review of Systems is negative other than noted in the HPI         Physical Exam:   Vitals were reviewed  Physical Exam   Temp: 99.8  F (37.7  C) Temp src: Oral Temp  Min: 98.7  F (37.1  C)  Max: 102.3  F (39.1  C) BP: (!) 76/35 Pulse: 81   Resp: 25 SpO2: 97 %        Vitals:    07/17/21 1858 07/17/21 2000 07/18/21 0300   Weight: 63.8 kg (140 lb 10.5 oz) 63.8 kg (140 lb 10.5 oz) 64.1 kg (141 lb 5 oz)       Intake/Output Summary (Last 24 hours) at 7/18/2021 0758  Last data filed at 7/18/2021 0757  Gross per 24 hour   Intake 1860 ml   Output 2400 ml   Net -540 ml       General:   Comfortable, no pain or respiratory distress   Neurologic:   Alert and oriented x 3   HEENT:   Head is atraumatic  PERRL  Tracheostomy scar to anterior neck       Lungs:   Symmetrical chest shape and movements with each tidal breath   Cardiovascular:   Normal neck veins  Normal S1,S2, and no gallop, rub or murmur   Abdomen:   Distended:  No.   Bowel sound present and normal: Yes .   Soft: Yes . Tender: Yes . RUQ tenderness  Rebound:tendeness or guarding present No   Extremities:   Clubbing present: No,   Edema present: No,   Acrocyanosis present: No,   Mottling present: No,   Normal capillary refill: Yes    Skin:   Warm, dry.  No rash on limited exam.    Lines/Drain:    Central line present: No  Arterial line present: No  External ventricular Drain present: No  Chest tube present: No  SUZY present: No  PEG present: No           Ancillary Data:   All laboratory and imaging data reviewed.   ABG/vent data:   Resp: 25    No lab results found in last 7 days.     ROUTINE IP LABS  Recent Labs   Lab 07/18/21  0432 07/18/21  0059 07/17/21  2145 07/17/21  1843   WBC 20.5*  --   --   --    HGB 12.0*  --   --   --    MCV 82  --   --   --    *  --   --   --      --   --   --     POTASSIUM 3.9  --  3.5  --    CHLORIDE 108  --   --   --    CO2 23  --   --   --    BUN 16  --   --   --    CR 1.11  --  1.07  --    ANIONGAP 7  --   --   --    SUSAN 7.4*  --   --   --    * 132*  --  84   ALBUMIN 2.9*  --   --   --    PROTTOTAL 5.7*  --   --   --    BILITOTAL 1.7*  --   --   --    ALKPHOS 86  --   --   --    ALT 44  --   --   --    AST 39  --   --   --      Recent Results (from the past 24 hour(s))   XR Chest Port 1 View    Narrative    EXAM: CHEST SINGLE VIEW PORTABLE  LOCATION: [facility]  DATE/TIME: 07/18/2021, 4:34 AM    INDICATION: Concern for aspiration.  COMPARISON: 11/06/2020.    FINDINGS: The lungs are clear. Normal size cardiac silhouette.      Impression    IMPRESSION: No evidence of active cardiopulmonary disease.              Jimmy Rahman MS4 July 18, 2021    Date of service 7/18/2021    I was present with the medical student who participated in the service and in the documentation of the note. I have verified the history and personally performed the physical exam and medical decision making. I agree with the assessment and plan of care as documented in the note.    45 minutes of critical care time, excluding procedures.     Yao Miller MD  Pulmonary Disease and Critical Care Medicine  [facility]

## 2021-07-18 NOTE — PROGRESS NOTES
CNS: Pt oriented x4, lethargic, calm, and cooperative. States increasing anxiety. Received IV 5mg valium.   CVS: SR/ST, hypotensive receiving 2L bolus NS, levo added @ 0630  Resp: RA LS clear  GI: tolerating clear liquids, no BM  : urinal voiding large amounts  Skin: scattered bruising and scabs  Access: PIV x2 L forearm  Plan: BC drawn, levo running, abx,

## 2021-07-19 ENCOUNTER — APPOINTMENT (OUTPATIENT)
Dept: PHYSICAL THERAPY | Facility: CLINIC | Age: 26
End: 2021-07-19
Attending: INTERNAL MEDICINE
Payer: COMMERCIAL

## 2021-07-19 LAB
ALBUMIN UR-MCNC: NEGATIVE MG/DL
ANION GAP SERPL CALCULATED.3IONS-SCNC: 1 MMOL/L (ref 3–14)
APPEARANCE UR: CLEAR
BILIRUB UR QL STRIP: NEGATIVE
BUN SERPL-MCNC: 8 MG/DL (ref 7–30)
CALCIUM SERPL-MCNC: 7.9 MG/DL (ref 8.5–10.1)
CHLORIDE BLD-SCNC: 117 MMOL/L (ref 94–109)
CO2 SERPL-SCNC: 26 MMOL/L (ref 20–32)
COLOR UR AUTO: NORMAL
CREAT SERPL-MCNC: 0.78 MG/DL (ref 0.66–1.25)
ERYTHROCYTE [DISTWIDTH] IN BLOOD BY AUTOMATED COUNT: 12.2 % (ref 10–15)
GFR SERPL CREATININE-BSD FRML MDRD: >90 ML/MIN/1.73M2
GLUCOSE BLD-MCNC: 119 MG/DL (ref 70–99)
GLUCOSE UR STRIP-MCNC: NEGATIVE MG/DL
HCT VFR BLD AUTO: 31 % (ref 40–53)
HGB BLD-MCNC: 10.7 G/DL (ref 13.3–17.7)
HGB UR QL STRIP: NEGATIVE
HIV 1+2 AB+HIV1 P24 AG SERPL QL IA: NONREACTIVE
KETONES UR STRIP-MCNC: NEGATIVE MG/DL
LEUKOCYTE ESTERASE UR QL STRIP: NEGATIVE
MCH RBC QN AUTO: 29 PG (ref 26.5–33)
MCHC RBC AUTO-ENTMCNC: 34.5 G/DL (ref 31.5–36.5)
MCV RBC AUTO: 84 FL (ref 78–100)
NITRATE UR QL: NEGATIVE
PH UR STRIP: 6.5 [PH] (ref 5–7)
PLATELET # BLD AUTO: 77 10E3/UL (ref 150–450)
POTASSIUM BLD-SCNC: 3.8 MMOL/L (ref 3.4–5.3)
RBC # BLD AUTO: 3.69 10E6/UL (ref 4.4–5.9)
SODIUM SERPL-SCNC: 144 MMOL/L (ref 133–144)
SP GR UR STRIP: 1.01 (ref 1–1.03)
UROBILINOGEN UR STRIP-MCNC: NORMAL MG/DL
WBC # BLD AUTO: 15.8 10E3/UL (ref 4–11)

## 2021-07-19 PROCEDURE — 250N000013 HC RX MED GY IP 250 OP 250 PS 637: Performed by: INTERNAL MEDICINE

## 2021-07-19 PROCEDURE — 99253 IP/OBS CNSLTJ NEW/EST LOW 45: CPT | Performed by: PSYCHIATRY & NEUROLOGY

## 2021-07-19 PROCEDURE — 999N000111 HC STATISTIC OT IP EVAL DEFER

## 2021-07-19 PROCEDURE — 120N000001 HC R&B MED SURG/OB

## 2021-07-19 PROCEDURE — 258N000003 HC RX IP 258 OP 636: Performed by: INTERNAL MEDICINE

## 2021-07-19 PROCEDURE — 36415 COLL VENOUS BLD VENIPUNCTURE: CPT | Performed by: STUDENT IN AN ORGANIZED HEALTH CARE EDUCATION/TRAINING PROGRAM

## 2021-07-19 PROCEDURE — 250N000011 HC RX IP 250 OP 636: Performed by: INTERNAL MEDICINE

## 2021-07-19 PROCEDURE — 86140 C-REACTIVE PROTEIN: CPT | Performed by: SPECIALIST

## 2021-07-19 PROCEDURE — 97530 THERAPEUTIC ACTIVITIES: CPT | Mod: GP

## 2021-07-19 PROCEDURE — 97116 GAIT TRAINING THERAPY: CPT | Mod: GP

## 2021-07-19 PROCEDURE — 250N000011 HC RX IP 250 OP 636: Performed by: STUDENT IN AN ORGANIZED HEALTH CARE EDUCATION/TRAINING PROGRAM

## 2021-07-19 PROCEDURE — C9113 INJ PANTOPRAZOLE SODIUM, VIA: HCPCS | Performed by: INTERNAL MEDICINE

## 2021-07-19 PROCEDURE — 99232 SBSQ HOSP IP/OBS MODERATE 35: CPT | Performed by: STUDENT IN AN ORGANIZED HEALTH CARE EDUCATION/TRAINING PROGRAM

## 2021-07-19 PROCEDURE — 250N000013 HC RX MED GY IP 250 OP 250 PS 637: Performed by: STUDENT IN AN ORGANIZED HEALTH CARE EDUCATION/TRAINING PROGRAM

## 2021-07-19 PROCEDURE — 250N000011 HC RX IP 250 OP 636: Performed by: NURSE PRACTITIONER

## 2021-07-19 PROCEDURE — 85027 COMPLETE CBC AUTOMATED: CPT | Performed by: STUDENT IN AN ORGANIZED HEALTH CARE EDUCATION/TRAINING PROGRAM

## 2021-07-19 PROCEDURE — 97161 PT EVAL LOW COMPLEX 20 MIN: CPT | Mod: GP

## 2021-07-19 PROCEDURE — 81003 URINALYSIS AUTO W/O SCOPE: CPT | Performed by: INTERNAL MEDICINE

## 2021-07-19 PROCEDURE — 258N000003 HC RX IP 258 OP 636: Performed by: STUDENT IN AN ORGANIZED HEALTH CARE EDUCATION/TRAINING PROGRAM

## 2021-07-19 PROCEDURE — 99233 SBSQ HOSP IP/OBS HIGH 50: CPT | Performed by: INTERNAL MEDICINE

## 2021-07-19 PROCEDURE — 80307 DRUG TEST PRSMV CHEM ANLYZR: CPT | Performed by: INTERNAL MEDICINE

## 2021-07-19 PROCEDURE — 80048 BASIC METABOLIC PNL TOTAL CA: CPT | Performed by: STUDENT IN AN ORGANIZED HEALTH CARE EDUCATION/TRAINING PROGRAM

## 2021-07-19 RX ORDER — CLONAZEPAM 0.5 MG/1
1 TABLET ORAL 2 TIMES DAILY PRN
Status: DISCONTINUED | OUTPATIENT
Start: 2021-07-19 | End: 2021-07-20 | Stop reason: HOSPADM

## 2021-07-19 RX ORDER — CYCLOBENZAPRINE HCL 5 MG
10 TABLET ORAL EVERY 8 HOURS PRN
Status: DISCONTINUED | OUTPATIENT
Start: 2021-07-19 | End: 2021-07-20 | Stop reason: HOSPADM

## 2021-07-19 RX ORDER — CYCLOBENZAPRINE HCL 5 MG
5 TABLET ORAL EVERY 8 HOURS PRN
Status: CANCELLED | OUTPATIENT
Start: 2021-07-19

## 2021-07-19 RX ORDER — KETOROLAC TROMETHAMINE 15 MG/ML
30 INJECTION, SOLUTION INTRAMUSCULAR; INTRAVENOUS EVERY 6 HOURS PRN
Status: DISCONTINUED | OUTPATIENT
Start: 2021-07-19 | End: 2021-07-20 | Stop reason: HOSPADM

## 2021-07-19 RX ADMIN — KETOROLAC TROMETHAMINE 30 MG: 15 INJECTION, SOLUTION INTRAMUSCULAR; INTRAVENOUS at 19:10

## 2021-07-19 RX ADMIN — ONDANSETRON 4 MG: 2 INJECTION INTRAMUSCULAR; INTRAVENOUS at 00:00

## 2021-07-19 RX ADMIN — PANTOPRAZOLE SODIUM 40 MG: 40 INJECTION, POWDER, FOR SOLUTION INTRAVENOUS at 09:33

## 2021-07-19 RX ADMIN — CLONAZEPAM 1 MG: 1 TABLET ORAL at 16:03

## 2021-07-19 RX ADMIN — CLONAZEPAM 1 MG: 1 TABLET ORAL at 19:09

## 2021-07-19 RX ADMIN — SENNOSIDES AND DOCUSATE SODIUM 1 TABLET: 8.6; 5 TABLET ORAL at 11:01

## 2021-07-19 RX ADMIN — DIAZEPAM 5 MG: 5 TABLET ORAL at 16:03

## 2021-07-19 RX ADMIN — PIPERACILLIN SODIUM AND TAZOBACTAM SODIUM 3.38 G: 3; .375 INJECTION, POWDER, LYOPHILIZED, FOR SOLUTION INTRAVENOUS at 23:18

## 2021-07-19 RX ADMIN — ACETAMINOPHEN 650 MG: 325 TABLET, FILM COATED ORAL at 06:29

## 2021-07-19 RX ADMIN — ONDANSETRON 4 MG: 2 INJECTION INTRAMUSCULAR; INTRAVENOUS at 17:07

## 2021-07-19 RX ADMIN — PANTOPRAZOLE SODIUM 40 MG: 40 INJECTION, POWDER, FOR SOLUTION INTRAVENOUS at 20:28

## 2021-07-19 RX ADMIN — ACETAMINOPHEN 650 MG: 325 TABLET, FILM COATED ORAL at 15:18

## 2021-07-19 RX ADMIN — BUPRENORPHINE HYDROCHLORIDE, NALOXONE HYDROCHLORIDE 0.25 FILM: 2; .5 FILM, SOLUBLE BUCCAL; SUBLINGUAL at 20:28

## 2021-07-19 RX ADMIN — DIAZEPAM 5 MG: 5 TABLET ORAL at 09:33

## 2021-07-19 RX ADMIN — PROCHLORPERAZINE EDISYLATE 10 MG: 5 INJECTION INTRAMUSCULAR; INTRAVENOUS at 19:16

## 2021-07-19 RX ADMIN — PIPERACILLIN SODIUM AND TAZOBACTAM SODIUM 3.38 G: 3; .375 INJECTION, POWDER, LYOPHILIZED, FOR SOLUTION INTRAVENOUS at 05:25

## 2021-07-19 RX ADMIN — CYCLOBENZAPRINE 10 MG: 10 TABLET, FILM COATED ORAL at 16:03

## 2021-07-19 RX ADMIN — ACETAMINOPHEN 650 MG: 325 TABLET, FILM COATED ORAL at 19:10

## 2021-07-19 RX ADMIN — VANCOMYCIN HYDROCHLORIDE 1250 MG: 5 INJECTION, POWDER, LYOPHILIZED, FOR SOLUTION INTRAVENOUS at 09:44

## 2021-07-19 RX ADMIN — PIPERACILLIN SODIUM AND TAZOBACTAM SODIUM 3.38 G: 3; .375 INJECTION, POWDER, LYOPHILIZED, FOR SOLUTION INTRAVENOUS at 18:01

## 2021-07-19 RX ADMIN — POTASSIUM CHLORIDE, DEXTROSE MONOHYDRATE AND SODIUM CHLORIDE: 150; 5; 450 INJECTION, SOLUTION INTRAVENOUS at 05:35

## 2021-07-19 RX ADMIN — VANCOMYCIN HYDROCHLORIDE 1250 MG: 5 INJECTION, POWDER, LYOPHILIZED, FOR SOLUTION INTRAVENOUS at 20:53

## 2021-07-19 RX ADMIN — BUPRENORPHINE HYDROCHLORIDE, NALOXONE HYDROCHLORIDE 0.25 FILM: 2; .5 FILM, SOLUBLE BUCCAL; SUBLINGUAL at 09:34

## 2021-07-19 RX ADMIN — ONDANSETRON 4 MG: 2 INJECTION INTRAMUSCULAR; INTRAVENOUS at 07:46

## 2021-07-19 ASSESSMENT — ACTIVITIES OF DAILY LIVING (ADL)
ADLS_ACUITY_SCORE: 13
ADLS_ACUITY_SCORE: 10

## 2021-07-19 ASSESSMENT — MIFFLIN-ST. JEOR: SCORE: 1692.25
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
## 2021-07-19 NOTE — PROGRESS NOTES
Critical Care Progress Note  07/19/2021    Name: Jose Ydoer MRN#: [id number]   Age: 26 year old YOB: 1995     [facility] Day# 2           Problem List:   Active Problems:    Opioid overdose (H)           Summary/Hospital Course:   Admitted from outside hospital for suspected opioid overdose and sepsis. Responded well to narcan drip. Started on norepinephrine drip. Broad spectrum antibiotics started. Narcan drip turned off. Norepinephrine drip turned off.       Assessment and plan :     Jose Yoder is a 26 year old male with PMH of polysubstance use disorder (heroin, opioids) on suboxone, PTSD/depression/anxiety on diazepam admitted on 7/17/2021 for sepsis and opioid withdrawal   I have personally reviewed the daily labs, imaging studies, cultures and discussed the case with referring physician and consulting physicians.   My assessment and plan by system for this patient is as follows:    Neurology:   1. Polysubstance use disorder, including heroin and opioids, on suboxone 0.5mg BID  2. PTSD/depression/anxiety, on diazepam 5mg TID  3. Headache, posterior neck pain radiating to the posterior head. Low suspicion for meningitis.   Plan  - drug screen pending  - continue PTA diazepam  - continue PTA suboxone  - stop naloxone drip  - PRN tylenol, consider flexeril  - Psych consult  - social work consult     Cardiovascular:   1. Hypotension 2/2 suspected sepsis, patient has received 3L of NS   2. Normotensive - norepinephrine off  Plan  - stop norepinephrine drip     Pulmonary/Ventilator Management:   - no acute concerns  Plan  - monitor     GI/Nutrition:  1. Epigastric pain with n/v/d, improving - patient had copious non-bilious vomiting and diarrhea early morning Saturday, OSH CT negative for SBO, bedside RUQ U/S is unremarkable  2. Hyperbilirubinemia, Tbili 1.7 on admission  Plan  - PRN zofran   - Regular diet     Renal/fluids/electrolytes:   1. No acute concerns  Plan  - daily bmp  - replete  electrolytes as needed  - monitor kidney function      Infectious Disease:   1. Possible sepsis - hypotensive, WBC 20.5 with neutrophil predominance, patient injected NS into a vein recently. UA negative. Blood cultures pending  Plan  - Blood cultures x2 pending, no growth after 12 hours  - continue vancomycin + pip/tazo, narrow as BC result     Endocrine:   - no acute concerns  Plan  - monitor      Hematology/Oncology:   1. Leukocytosis 2/2 bacteremia, improving - WBC 15.8 (20.5 yesterday)  2. Mild anemia - hgb down to 10.7 (12.0 yesterday), likely dilutional, no evidence of acute bleed  Plan  - daily cbc     MSK/Rheum:  - no acute concerns   Plan  - PT/OT     IV/Access:   - PIV x2 in L AC    ICU Prophylaxis:   1. DVT: mechanical  2. Feeding - normal diet   3. Family Update: will call to update mother  4. Disposition - to floor    Key goals for next 24 hours:   1. Psych consult   2.  Continue antibiotics   3.  Transfer to medicine         Interim History/Overnight Events:   Patient had ongoing neck pain headache throughout the night. Believes it is do to muscle tension.         Key Medications:       buprenorphine HCl-naloxone HCl  0.25 Film Sublingual BID     diazepam  5 mg Oral TID     pantoprazole (PROTONIX) IV  40 mg Intravenous BID     piperacillin-tazobactam  3.375 g Intravenous Q6H     sodium chloride (PF)  3 mL Intracatheter Q8H     sodium chloride (PF)  3 mL Intracatheter Q8H     vancomycin  1,250 mg Intravenous Q12H       dextrose 5% and 0.45% NaCl + KCl 20 mEq/L 100 mL/hr at 07/19/21 0535     norepinephrine Stopped (07/19/21 0745)     sodium chloride 20 mL/hr at 07/18/21 1859               Physical Examination:   Temp:  [97.3  F (36.3  C)-99.7  F (37.6  C)] 97.7  F (36.5  C)  Pulse:  [52-99] 72  Resp:  [8-27] 15  BP: ()/(39-83) 109/70  SpO2:  [96 %-100 %] 98 %    Intake/Output Summary (Last 24 hours) at 7/19/2021 0841  Last data filed at 7/19/2021 0700  Gross per 24 hour   Intake 7217.9 ml   Output  5775 ml   Net 1442.9 ml     Wt Readings from Last 4 Encounters:   07/19/21 69.6 kg (153 lb 7 oz)   07/30/14 70.1 kg (154 lb 9.6 oz) (64 %, Z= 0.36)*   07/28/14 74 kg (163 lb 2.3 oz) (75 %, Z= 0.66)*   04/24/13 73.5 kg (162 lb) (83 %, Z= 0.94)*     * Growth percentiles are based on [place] (Boys, 2-20 Years) data.     BP - Mean:  [45-90] 78  Resp: 15    No lab results found in last 7 days.    GEN: no acute distress   HEENT: head ncat, sclera anicteric, trachea midline, no neck rigidity  PULM: unlabored, clear anteriorly    CV/COR: RRR, normal S1 and S2. No gallop, rub, nor murmur  ABD: soft, non-tender, non-distended  MSK/EXT:  No LE edema. Good distal pulses. Good cap refill  NEURO: grossly intact  SKIN: no obvious rash  LINES: clean, dry intact         Data:   All data and imaging reviewed.     ROUTINE ICU LABS (Last four results)  CMP  Recent Labs   Lab 07/19/21  0505 07/18/21  0432 07/18/21  0059 07/17/21  2145 07/17/21  1843    138  --   --   --    POTASSIUM 3.8 3.9  --  3.5  --    CHLORIDE 117* 108  --   --   --    CO2 26 23  --   --   --    ANIONGAP 1* 7  --   --   --    * 114* 132*  --  84   BUN 8 16  --   --   --    CR 0.78 1.11  --  1.07  --    GFRESTIMATED >90 >90  --  >90  --    SUSAN 7.9* 7.4*  --   --   --    PROTTOTAL  --  5.7*  --   --   --    ALBUMIN  --  2.9*  --   --   --    BILITOTAL  --  1.7*  --   --   --    ALKPHOS  --  86  --   --   --    AST  --  39  --   --   --    ALT  --  44  --   --   --      CBC  Recent Labs   Lab 07/19/21  0505 07/18/21  0432   WBC 15.8* 20.5*   RBC 3.69* 4.09*   HGB 10.7* 12.0*   HCT 31.0* 33.5*   MCV 84 82   MCH 29.0 29.3   MCHC 34.5 35.8   RDW 12.2 11.9   PLT 77* 111*     INRNo lab results found in last 7 days.  Arterial Blood GasNo lab results found in last 7 days.    All cultures:  No results for input(s): CULT in the last 168 hours.  No results found for this or any previous visit (from the past 24 hour(s)).              Jimmy Rahman  MS4  _________________________________________________________________________________________________  Physician Attestation   I, Raimundo Foreman, was present with the medical/ANISA student who participated in the service and in the documentation of the note.  I have verified the history and personally performed the physical exam and medical decision making.  I agree with the assessment and plan of care as documented in the note.      I personally reviewed vital signs, medications, labs and imaging.    26-year-old male with history of polysubstance abuse on Suboxone and significant psych history admitted with possible opiate withdrawal and hypotension sign of possible sepsis.  Initially required Levophed with unclear source (was injecting normal saline) but blood cultures are negative.  Pressors weaned off this morning.  Seen by psych and awaiting their formal recommendations.  No longer critically ill and can be transferred to the medicine service.    Raimundo Foreman MD  Date of Service (when I saw the patient): 07/19/21  _____________________________________________________________________________________

## 2021-07-19 NOTE — PROVIDER NOTIFICATION
Pt suddenly states I need something to calm me down now, I need pshiciatric now, im very sick of this hospial,Im fine I dont need to be here.  Pt threw his salad, ripped his IV's off, Im leaving here.  Dr. Foreman called, pt. Planning on going AMA, awaiting for a ride home.  Pt took off heart monitor, put pants on.

## 2021-07-19 NOTE — PROGRESS NOTES
[facility]    Hospitalist Progress Note      Assessment & Plan   Joselian Yoder is a 26 year old male who was admitted on 7/17/2021 for possible opioid overdose and subsequently develop shock requiring pressors, possible septic shock. Transfer out of ICU 07/19.    Shock, likely septic   - unclear etiology of shock presumed septic however cultures remain negative  - will obtain cardiac echo and continue IV antibiotics for now  - monitor vitals    Possible opioid overdose  Polysubstance abuse  -off narcan drip since yesterday  - continue PTA medications, confirmed by pharmacy  - psych evaluated and patient is not holdable will need to leave AMA if leaves  - he is not agreeable to any chemical dependency assessment    Nausea/vomiting  - present on admission  - resolved    DVT Prophylaxis: Ambulate every shift  Code Status: Full Code  Expected discharge: tomorrow pending echo and further clinical     Flavia Herrera, DO  Text Page (7am - 6pm)    Interval History    Patient anxious on my assessment. He denies pain but tells me he can't stay in the hospital like this. No fevers no chills. No cough. No SOA. He tells me that he will likely leave tonight to which he was informed it would be AMA.    -Data reviewed today: I reviewed all new labs and imaging results over the last 24 hours.       Physical Exam   Temp: 98.7  F (37.1  C) Temp src: Axillary BP: (!) 137/101 Pulse: 80   Resp: 13 SpO2: 100 % O2 Device: None (Room air)    Vitals:    07/17/21 2000 07/18/21 0300 07/19/21 0515   Weight: 63.8 kg (140 lb 10.5 oz) 64.1 kg (141 lb 5 oz) 69.6 kg (153 lb 7 oz)     Vital Signs with Ranges  Temp:  [97.3  F (36.3  C)-98.9  F (37.2  C)] 98.7  F (37.1  C)  Pulse:  [52-92] 80  Resp:  [8-22] 13  BP: ()/() 137/101  SpO2:  [96 %-100 %] 100 %  I/O last 3 completed shifts:  In: 3787.05 [P.O.:1420; I.V.:2367.05]  Out: 3875 [Urine:3875]    Constitutional:  Awake, alert, cooperative, anxious with  tachycardia  Respiratory: Clear to auscultation bilaterally, no crackles or wheezing  Cardiovascular: Regular rate and rhythm, normal S1 and S2, and no murmur noted  GI: Normal bowel sounds, soft, non-distended, non-tender  Skin/Integumen: No rashes, no cyanosis, no edema  Other: Various injection sites noted, mild erythema to LUE no swelling  Psych: pressured tangential and grandiose    Medications     dextrose 5% and 0.45% NaCl + KCl 20 mEq/L 100 mL/hr at 07/19/21 0800     sodium chloride 20 mL/hr at 07/19/21 0800       buprenorphine HCl-naloxone HCl  0.25 Film Sublingual BID     diazepam  5 mg Oral TID     pantoprazole (PROTONIX) IV  40 mg Intravenous BID     piperacillin-tazobactam  3.375 g Intravenous Q6H     sodium chloride (PF)  3 mL Intracatheter Q8H     sodium chloride (PF)  3 mL Intracatheter Q8H     vancomycin  1,250 mg Intravenous Q12H       Data   Recent Labs   Lab 07/19/21  0505 07/18/21  0432 07/18/21  0059 07/17/21  2145   WBC 15.8* 20.5*  --   --    HGB 10.7* 12.0*  --   --    MCV 84 82  --   --    PLT 77* 111*  --   --     138  --   --    POTASSIUM 3.8 3.9  --  3.5   CHLORIDE 117* 108  --   --    CO2 26 23  --   --    BUN 8 16  --   --    CR 0.78 1.11  --  1.07   ANIONGAP 1* 7  --   --    SUSAN 7.9* 7.4*  --   --    * 114* 132*  --    ALBUMIN  --  2.9*  --   --    PROTTOTAL  --  5.7*  --   --    BILITOTAL  --  1.7*  --   --    ALKPHOS  --  86  --   --    ALT  --  44  --   --    AST  --  39  --   --        No results found for this or any previous visit (from the past 24 hour(s)).

## 2021-07-19 NOTE — PLAN OF CARE
OT: orders received and chart reviewed. Pt up w/ SBA w/ PT, able to complete self-cares w/ SBA. Anticipate w/ continued medical mgmt and OOB mobility that pt will progress to safely return home w/ no self-care concerns. Will complete OT orders as no skilled IP OT needs identified.

## 2021-07-19 NOTE — PLAN OF CARE
Pt. Mainly cooperative til 1200, then just spilled his salad then threw it all over the floor and  bed and stated I'm leaving, Im stable.  Informed pt. Would be AMA if left, pt. Calling for a ride but unable to get one.  Asking for muscle relaxer , extra valium til Hospitialist ordered after consult.  Pt. Pulled off his iv's, ekg , refused to replace them .  Now pt. Agreeing to stay after prn's ordered.  Pt. Wants iv restarted and to continue antibiotics.  More calm now since talking with hospitalist.

## 2021-07-19 NOTE — PROGRESS NOTES
07/19/21 0900   Quick Adds   Type of Visit Initial PT Evaluation   Living Environment   People in home parent(s)   Current Living Arrangements house   Home Accessibility stairs to enter home;stairs within home   Number of Stairs, Main Entrance 2   Stair Railings, Main Entrance railings safe and in good condition;railing on right side (ascending)   Number of Stairs, Within Home, Primary 6   Stair Railings, Within Home, Primary railings safe and in good condition;railing on right side (ascending)   Transportation Anticipated car, drives self   Self-Care   Usual Activity Tolerance excellent   Current Activity Tolerance good   Regular Exercise No   Equipment Currently Used at Home none   Disability/Function   Fall history within last six months no   General Information   Onset of Illness/Injury or Date of Surgery 07/17/21   Referring Physician Dr. Herrera   Patient/Family Therapy Goals Statement (PT) To go home   Pertinent History of Current Problem (include personal factors and/or comorbidities that impact the POC) Pt is a 26 year old male admitted for opiod overdose.   Existing Precautions/Restrictions fall   Cognition   Orientation Status (Cognition) oriented x 4   Affect/Mental Status (Cognition) WFL   Pain Assessment   Patient Currently in Pain No   Range of Motion (ROM)   ROM Quick Adds ROM WFL   Strength   Manual Muscle Testing Quick Adds Strength WFL   Bed Mobility   Comment (Bed Mobility) SBA   Transfers   Transfer Safety Comments CGA   Gait/Stairs (Locomotion)   Comment (Gait/Stairs) 25' CGA, mild lateral path devaitions   Balance   Balance Comments Good in sitting, fair in standing   Clinical Impression   Criteria for Skilled Therapeutic Intervention yes, treatment indicated   PT Diagnosis (PT) Impaired ambulation   Influenced by the following impairments Decreased endurance and balance   Functional limitations due to impairments Difficulty with bed mobility, transfers, ambulation, stair management    Clinical Presentation Stable/Uncomplicated   Clinical Presentation Rationale VSS, pain controlled   Clinical Decision Making (Complexity) low complexity   Therapy Frequency (PT) Daily   Predicted Duration of Therapy Intervention (days/wks) 3 days   Planned Therapy Interventions (PT) balance training;bed mobility training;gait training;patient/family education;transfer training;stair training   Risk & Benefits of therapy have been explained evaluation/treatment results reviewed;care plan/treatment goals reviewed;risks/benefits reviewed;current/potential barriers reviewed;participants voiced agreement with care plan;participants included;patient   PT Discharge Planning    PT Discharge Recommendation (DC Rec) home   PT Rationale for DC Rec Anticipate with further medical management and therapy, patient will progress to independence be safe to discharge home with family assist as needed.    PT Brief overview of current status  SBA to CGA   Total Evaluation Time   Total Evaluation Time (Minutes) 10

## 2021-07-19 NOTE — PROGRESS NOTES
"SPIRITUAL HEALTH SERVICES  SPIRITUAL ASSESSMENT Progress Note  [facility] ICU     REFERRAL SOURCE: [facility] Consult/Spiritual Health Request    I shared a reflective visit with Jose today. I introduced self/role and SHS.    -Jose spent much time reflecting on what brought him to the hospital and current stressors he has. He spent time sharing about trauma he has experienced and his current experience of ptsd as an adult. Jose shares he has minimal supports and that the \"only thing that helps is smoking weed.\"    -I introduced the [facility] document and oriented Jose to its purpose. He shares he is interested in completing this as he wants his friend and mother to be his decision maker.    -We also reflected on some possible supports for coping for him during this time. He shares he met with the  and was eager to learn about possible resources for him once he leaves the hospital.    I spent time offering emotional support through reflective listening, affirmation of emotions/experiences and words of support.     PLAN: I will follow up with Jose tomorrow to check in on his health care directive.     Carla Espinosa  Associate    Phone: [phone]  Pager: [phone]    "

## 2021-07-19 NOTE — PROGRESS NOTES
Patient c/o headache all night. Received tylenol q 4 hr as ordered PRN while awake. Asking about something stronger. MD made aware and spoke to patient this morning. Levophed stopped last evening but restarted while patient sleeping as BP dropped. Now that he is awake, able to wean down again.

## 2021-07-19 NOTE — PLAN OF CARE
Neuro: intact, A&Ox4  CV: refusing tele, normotensive  Resp: room air, LS clear  GI: reg diet, bs active  : voids in urial  Skin: bruised, scab to LUE  Lines: na  Access: L PIV SL  Gtts: na  Other: plan to tx up to m/s bed, report given to joslyn RN

## 2021-07-19 NOTE — PHARMACY-VANCOMYCIN DOSING SERVICE
"Pharmacy Vancomycin Note  Date of Service 2021  Patient's  1995   26 year old, male    Indication: Intra-abdominal infection  Day of Therapy: 2  Current vancomycin regimen:  750 mg IV q12h  Current vancomycin monitoring method: AUC  Current vancomycin therapeutic monitoring goal: 400-600 mg*h/L    Current estimated CrCl = Estimated Creatinine Clearance: 143.8 mL/min (based on SCr of 0.78 mg/dL).    Creatinine for last 3 days  2021:  9:45 PM Creatinine 1.07 mg/dL  2021:  4:32 AM Creatinine 1.11 mg/dL  2021:  5:05 AM Creatinine 0.78 mg/dL    Recent Vancomycin Levels (past 3 days)  No results found for requested labs within last 72 hours.    Vancomycin IV Administrations (past 72 hours)                   vancomycin (VANCOCIN) 750 mg in sodium chloride 0.9 % 250 mL intermittent infusion (mg) 750 mg New Bag 21 2040    vancomycin 1500 mg in 0.9% NaCl 250 ml intermittent infusion 1,500 mg (mg) 1,500 mg New Bag 21 0843                Nephrotoxins and other renal medications (From now, onward)    Start     Dose/Rate Route Frequency Ordered Stop    21 0900  vancomycin 1250 mg in 0.9% NaCl 250 mL intermittent infusion 1,250 mg      1,250 mg  over 90 Minutes Intravenous EVERY 12 HOURS 21 0743      21 0630  norepinephrine (LEVOPHED) 4 mg in  mL PERIPHERAL infusion      0.03-0.125 mcg/kg/min × 63.8 kg  7.2-29.9 mL/hr  Intravenous CONTINUOUS 21 0611      21 0600  piperacillin-tazobactam (ZOSYN) 3.375 g vial to attach to  mL bag     Note to Pharmacy: For [facility], [facility] and [facility]: For Zosyn-naive patients, use the \"Zosyn initial dose + extended infusion\" order panel.    3.375 g  over 30 Minutes Intravenous EVERY 6 HOURS 21 3244               Contrast Orders - past 72 hours (72h ago, onward)    None          Interpretation of levels and current regimen:  Vancomycin level is reflective of empirically changing due to improvement in SCr    Has serum " creatinine changed greater than 50% in last 72 hours: No    Urine output:  unable to determine    Renal Function: Improving    Selected regimen: 1250 mg every 12 hours  mg_kg 19.5 mg_kg  Inf. length 1.5 hours  Start time 07/18/2021 09:00  AUC24,ss 513 mg/L.hr  PAUC* 74 %  Ctrough,ss 14.3 mg/L  Pconc* 27 %  Tox. 9 %  Previous regimen: 750 mg every 12hr    Plan:  1. Increase Dose to 1250 mg q12h  2. Vancomycin monitoring method: AUC  3. Vancomycin therapeutic monitoring goal: 400-600 mg*h/L  4. Pharmacy will check vancomycin levels as appropriate in 1-3 Days.  5. Serum creatinine levels will be ordered a minimum of twice weekly.    Leonardo Lang RP

## 2021-07-19 NOTE — CONSULTS
Care Management Initial Consult    General Information  Assessment completed with: Patient,    Type of CM/SW Visit: CM Role Introduction  SW was consulted as pt was stating he was leaving AMA and needed a ride home.    Primary Care Provider verified and updated as needed:     Readmission within the last 30 days:        Reason for Consult: transportation  Advance Care Planning:            Communication Assessment  Patient's communication style: spoken language (English or Bilingual)    Hearing Difficulty or Deaf: no   Wear Glasses or Blind: no    Cognitive  Cognitive/Neuro/Behavioral: WDL  Level of Consciousness: lethargic  Arousal Level: opens eyes spontaneously  Orientation: oriented x 4  Mood/Behavior: calm  Best Language: 0 - No aphasia  Speech: spontaneous, clear    Living Environment:   People in home: parent(s)    Parents are  and pt states he is staying with his father currently  Current living Arrangements: house      Able to return to prior arrangements:         Family/Social Support:  Care provided by:    Provides care for:    Marital Status: Single             Description of Support System:  Difficult to assess. Pt states that he has applied for MA and that his mother has assisted him with this.Pt states that he has applied for disability and been denied.  He states that he sees a PA at the [facility] but that she is no longer willing to follow him or he is no longer willing to see her. Level of support is difficult to assess as pt rambles a great deal and seems to feel that no one is really helping him or providing the assistance that he needs.        Current Resources:   Patient receiving home care services:       Community Resources:    Equipment currently used at home: none  Supplies currently used at home:      Employment/Financial:  Employment Status: disabled, employed part-time        Financial Concerns:  Pt states that he has MA but this in not listed on face sheet.     "    Lifestyle & Psychosocial Needs:  Social Determinants of Health     Tobacco Use:      Smoking Tobacco Use:      Smokeless Tobacco Use:    Alcohol Use:      Frequency of Alcohol Consumption:      Average Number of Drinks:      Frequency of Binge Drinking:    Financial Resource Strain:      Difficulty of Paying Living Expenses:    Food Insecurity:      Worried About Running Out of Food in the Last Year:      Ran Out of Food in the Last Year:    Transportation Needs:      Lack of Transportation (Medical):      Lack of Transportation (Non-Medical):    Physical Activity:      Days of Exercise per Week:      Minutes of Exercise per Session:    Stress:      Feeling of Stress :    Social Connections:      Frequency of Communication with Friends and Family:      Frequency of Social Gatherings with Friends and Family:      Attends [employer] Services:      Active Member of Clubs or Organizations:      Attends Club or Organization Meetings:      Marital Status:    Intimate Partner Violence:      Fear of Current or Ex-Partner:      Emotionally Abused:      Physically Abused:      Sexually Abused:    Depression:      PHQ-2 Score:    Housing Stability:      Unable to Pay for Housing in the Last Year:      Number of Places Lived in the Last Year:      Unstable Housing in the Last Year:        Functional Status:  Prior to admission patient needed assistance: 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 He is seen by [facility] and states he has a PCP but has not seen her in a long time.  Pt is able to drive and take care of own physical needs. Pt  Self medicates and put needle in arm to \"hydrate\" himself.     Mental Health Status:    ADHD,PTSD ( he states from terrible home life) Pt states he has behavioral issues and has been in and out of treatment all his life.        Chemical Dependency Status:   poly substance abuse.           Values/Beliefs:  Spiritual, Cultural Beliefs, [employer] Practices, " Values that affect care:    Description of Beliefs that Will Affect Care: na            Additional Information:  After a lengthy meeting with pt, hospitalist met with pt and he has agreed to stay and not discharge AMA at least right now.  Pt expresses that he was not getting the care that he needed and was going to leave AMA.  SW reviewed process of discharge planning and that he would need to secure mental health and PCP follow up to reapply for disability services. SW also indicated that this is something that is ongoing and that it is not   Likely to be completed in the hospital by hospital staff, that he needed to receive some care coordination in community ongoing to manage his health needs.  SW encouraged pt to allow himself to be evaluated and treated by the medical professionals and perhaps discuss mental  health follow up with Dr Stapleton during next visit.     JAVIER Barksdale  [facility]  [phone]

## 2021-07-20 ENCOUNTER — APPOINTMENT (OUTPATIENT)
Dept: CARDIOLOGY | Facility: CLINIC | Age: 26
End: 2021-07-20
Attending: STUDENT IN AN ORGANIZED HEALTH CARE EDUCATION/TRAINING PROGRAM
Payer: COMMERCIAL

## 2021-07-20 VITALS
DIASTOLIC BLOOD PRESSURE: 46 MMHG | BODY MASS INDEX: 21.97 KG/M2 | TEMPERATURE: 97.4 F | HEART RATE: 49 BPM | WEIGHT: 153.44 LBS | HEIGHT: 70 IN | RESPIRATION RATE: 16 BRPM | OXYGEN SATURATION: 96 % | SYSTOLIC BLOOD PRESSURE: 93 MMHG

## 2021-07-20 LAB
ANION GAP SERPL CALCULATED.3IONS-SCNC: <1 MMOL/L (ref 3–14)
BASOPHILS # BLD AUTO: 0 10E3/UL (ref 0–0.2)
BASOPHILS NFR BLD AUTO: 0 %
BUN SERPL-MCNC: 7 MG/DL (ref 7–30)
CALCIUM SERPL-MCNC: 8.5 MG/DL (ref 8.5–10.1)
CHLORIDE BLD-SCNC: 113 MMOL/L (ref 94–109)
CO2 SERPL-SCNC: 31 MMOL/L (ref 20–32)
CREAT SERPL-MCNC: 0.89 MG/DL (ref 0.66–1.25)
CRP SERPL-MCNC: 134 MG/L (ref 0–8)
EOSINOPHIL # BLD AUTO: 0.2 10E3/UL (ref 0–0.7)
EOSINOPHIL NFR BLD AUTO: 2 %
ERYTHROCYTE [DISTWIDTH] IN BLOOD BY AUTOMATED COUNT: 12.2 % (ref 10–15)
ERYTHROCYTE [SEDIMENTATION RATE] IN BLOOD BY WESTERGREN METHOD: 10 MM/HR (ref 0–15)
GFR SERPL CREATININE-BSD FRML MDRD: >90 ML/MIN/1.73M2
GLUCOSE BLD-MCNC: 97 MG/DL (ref 70–99)
GLUCOSE BLDC GLUCOMTR-MCNC: 82 MG/DL (ref 70–99)
HCT VFR BLD AUTO: 33.7 % (ref 40–53)
HGB BLD-MCNC: 11.4 G/DL (ref 13.3–17.7)
IMM GRANULOCYTES # BLD: 0 10E3/UL
IMM GRANULOCYTES NFR BLD: 1 %
LVEF ECHO: NORMAL
LYMPHOCYTES # BLD AUTO: 1.9 10E3/UL (ref 0.8–5.3)
LYMPHOCYTES NFR BLD AUTO: 22 %
MCH RBC QN AUTO: 28.1 PG (ref 26.5–33)
MCHC RBC AUTO-ENTMCNC: 33.8 G/DL (ref 31.5–36.5)
MCV RBC AUTO: 83 FL (ref 78–100)
MONOCYTES # BLD AUTO: 0.4 10E3/UL (ref 0–1.3)
MONOCYTES NFR BLD AUTO: 4 %
NEUTROPHILS # BLD AUTO: 6.2 10E3/UL (ref 1.6–8.3)
NEUTROPHILS NFR BLD AUTO: 71 %
NRBC # BLD AUTO: 0 10E3/UL
NRBC BLD AUTO-RTO: 0 /100
PLATELET # BLD AUTO: 94 10E3/UL (ref 150–450)
POTASSIUM BLD-SCNC: 3.5 MMOL/L (ref 3.4–5.3)
RBC # BLD AUTO: 4.05 10E6/UL (ref 4.4–5.9)
SODIUM SERPL-SCNC: 144 MMOL/L (ref 133–144)
WBC # BLD AUTO: 8.8 10E3/UL (ref 4–11)

## 2021-07-20 PROCEDURE — 250N000013 HC RX MED GY IP 250 OP 250 PS 637: Performed by: STUDENT IN AN ORGANIZED HEALTH CARE EDUCATION/TRAINING PROGRAM

## 2021-07-20 PROCEDURE — C9113 INJ PANTOPRAZOLE SODIUM, VIA: HCPCS | Performed by: INTERNAL MEDICINE

## 2021-07-20 PROCEDURE — 250N000011 HC RX IP 250 OP 636: Performed by: STUDENT IN AN ORGANIZED HEALTH CARE EDUCATION/TRAINING PROGRAM

## 2021-07-20 PROCEDURE — 99239 HOSP IP/OBS DSCHRG MGMT >30: CPT | Performed by: STUDENT IN AN ORGANIZED HEALTH CARE EDUCATION/TRAINING PROGRAM

## 2021-07-20 PROCEDURE — 80048 BASIC METABOLIC PNL TOTAL CA: CPT | Performed by: STUDENT IN AN ORGANIZED HEALTH CARE EDUCATION/TRAINING PROGRAM

## 2021-07-20 PROCEDURE — 85652 RBC SED RATE AUTOMATED: CPT | Performed by: SPECIALIST

## 2021-07-20 PROCEDURE — 36415 COLL VENOUS BLD VENIPUNCTURE: CPT | Performed by: STUDENT IN AN ORGANIZED HEALTH CARE EDUCATION/TRAINING PROGRAM

## 2021-07-20 PROCEDURE — 85025 COMPLETE CBC W/AUTO DIFF WBC: CPT | Performed by: STUDENT IN AN ORGANIZED HEALTH CARE EDUCATION/TRAINING PROGRAM

## 2021-07-20 PROCEDURE — 250N000011 HC RX IP 250 OP 636: Performed by: INTERNAL MEDICINE

## 2021-07-20 PROCEDURE — 258N000003 HC RX IP 258 OP 636: Performed by: STUDENT IN AN ORGANIZED HEALTH CARE EDUCATION/TRAINING PROGRAM

## 2021-07-20 RX ORDER — CEFTRIAXONE 2 G/1
2 INJECTION, POWDER, FOR SOLUTION INTRAMUSCULAR; INTRAVENOUS EVERY 24 HOURS
Status: DISCONTINUED | OUTPATIENT
Start: 2021-07-20 | End: 2021-07-20 | Stop reason: HOSPADM

## 2021-07-20 RX ADMIN — DIAZEPAM 5 MG: 5 TABLET ORAL at 09:07

## 2021-07-20 RX ADMIN — VANCOMYCIN HYDROCHLORIDE 1250 MG: 5 INJECTION, POWDER, LYOPHILIZED, FOR SOLUTION INTRAVENOUS at 09:08

## 2021-07-20 RX ADMIN — BUPRENORPHINE HYDROCHLORIDE, NALOXONE HYDROCHLORIDE 0.25 FILM: 2; .5 FILM, SOLUBLE BUCCAL; SUBLINGUAL at 09:06

## 2021-07-20 RX ADMIN — PANTOPRAZOLE SODIUM 40 MG: 40 INJECTION, POWDER, FOR SOLUTION INTRAVENOUS at 09:07

## 2021-07-20 RX ADMIN — CYCLOBENZAPRINE 10 MG: 10 TABLET, FILM COATED ORAL at 09:07

## 2021-07-20 RX ADMIN — CLONAZEPAM 1 MG: 1 TABLET ORAL at 11:31

## 2021-07-20 RX ADMIN — PIPERACILLIN SODIUM AND TAZOBACTAM SODIUM 3.38 G: 3; .375 INJECTION, POWDER, LYOPHILIZED, FOR SOLUTION INTRAVENOUS at 05:17

## 2021-07-20 ASSESSMENT — ACTIVITIES OF DAILY LIVING (ADL)
ADLS_ACUITY_SCORE: 11
ADLS_ACUITY_SCORE: 11
ADLS_ACUITY_SCORE: 10
ADLS_ACUITY_SCORE: 10

## 2021-07-20 NOTE — PLAN OF CARE
Summary:     DATE & TIME: 7/19/21 1900--0730  Cognitive Concerns/ Orientation : AOx4,   BEHAVIOR & AGGRESSION TOOL COLOR: green/orange (escalates quickly into agitation).   CIWA SCORE:   ABNL VS/O2: VSS, RA  MOBILITY: IND  PAIN MANAGMENT: Denied pain overnight  DIET: reg, poor PO  BOWEL/BLADDER: cont to BR  ABNL LAB/BG: K 3.8.   DRAIN/DEVICES: PIV SL  TELEMETRY RHYTHM: Pt refusing telemetry monitoring. HR tomasz in high 40s low 50s.  SKIN: pale   TESTS/PROCEDURES: ECHO for tomorrow  D/C DAY/GOALS/PLACE: pending ECHO results  OTHER IMPORTANT INFO: occ threatens to leave AMA
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
## 2021-07-20 NOTE — PLAN OF CARE
Summary:     DATE & TIME:7/19/21  Cognitive Concerns/ Orientation : AOx4,   BEHAVIOR & AGGRESSION TOOL COLOR: green/orange (escalates quickly into agitation)  CIWA SCORE:   ABNL VS/O2: VSS, RA  MOBILITY: IND  PAIN MANAGMENT: tylenol for HA, not effective. Got agitated and asking for something stronger (on-call notified, toradol ordered)  DIET: reg, poor PO  BOWEL/BLADDER: cont  to BR  ABNL LAB/BG:   DRAIN/DEVICES: PIV SL  TELEMETRY RHYTHM:   SKIN: pale   TESTS/PROCEDURES: ECHO for tomorrow  D/C DAY/GOALS/PLACE: pending ECHO results  OTHER IMPORTANT INFO: occ threatens to leave AMA
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
## 2021-07-20 NOTE — PLAN OF CARE
Physical Therapy Discharge Summary    Reason for therapy discharge:    Pt left hospital AMA    Progress towards therapy goal(s). See goals on Care Plan in [facility] electronic health record for goal details.  Goals not met.  Barriers to achieving goals:   discharge from facility.    Therapy recommendation(s):    No further therapy is recommended.
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
## 2021-07-23 LAB
ATRIAL RATE - MUSE: 83 BPM
BACTERIA BLD CULT: NO GROWTH
BACTERIA BLD CULT: NO GROWTH
DIASTOLIC BLOOD PRESSURE - MUSE: NORMAL MMHG
INTERPRETATION ECG - MUSE: NORMAL
P AXIS - MUSE: 70 DEGREES
PR INTERVAL - MUSE: 150 MS
QRS DURATION - MUSE: 92 MS
QT - MUSE: 396 MS
QTC - MUSE: 465 MS
R AXIS - MUSE: 76 DEGREES
SYSTOLIC BLOOD PRESSURE - MUSE: NORMAL MMHG
T AXIS - MUSE: 54 DEGREES
VENTRICULAR RATE- MUSE: 83 BPM

## 2021-07-26 NOTE — DISCHARGE SUMMARY
[facility]    Discharge Summary  Hospitalist    Date of Admission:  7/17/2021  Date of Discharge:  7/20/2021 12:51 PM  Discharging Provider: Flavia Herrera DO  Date of Service (when I saw the patient): 07/20/2021    Discharge Diagnoses   As below    Hospital Course   Jose Yoder is a 26 year old male who was admitted on 7/17/2021 for possible opioid overdose and subsequently develop shock requiring pressors, possible septic shock. Transfered out of ICU 07/19 and patient left the hospital AMA 07/20.     Shock, likely septic   - unclear etiology of shock presumed septic however cultures remain negative  - cardiac echo obtained with normal function however thickened mitral valve concerning findings. ID consulted with recommendations for KRISTYN however patient left AMA. He was informed of the findings and concern for infection prior to leaving.  - monitor vitals     Possible opioid overdose  Polysubstance abuse  -off narcan drip since yesterday  - continue PTA medications, confirmed by pharmacy  - psych evaluated and patient is not holdable will need to leave AMA if leaves  - he is not agreeable to any chemical dependency assessment     Nausea/vomiting  - present on admission  - resolved    Flavia Herrera DO    Significant Results and Procedures    Interpretation Summary     The rhythm was sinus bradycardia.  Left ventricular systolic function is normal.  The visual ejection fraction is 55-60%.  The mitral valve leaflets are mildly thickened.  Mobile structure noted, attached to anterior mitral valve, likely mobile,  redundunt chord. However, a small vegetation is not entirely excluded.  Consider KRISTYN if clinically appropriate.  There is trace to mild mitral regurgitation.  The study was technically difficult. There is no comparison study available.    Pending Results   Unresulted Labs Ordered in the Past 30 Days of this Admission     Date and Time Order Name Status Description    7/18/2021   6:18 AM Drug Abuse Scr  Ur w Qnt Reflx In process           Code Status   Full Code       Primary Care Physician   Ana Connor        Discharge Disposition   Discharged to home  Condition at discharge: AMA    Consultations This Hospital Stay   PHYSICAL THERAPY ADULT IP CONSULT  OCCUPATIONAL THERAPY ADULT IP CONSULT  PHARMACY TO DOSE VANCO  INTENSIVIST IP CONSULT   REVIEW IP PHARMACY CONSULT  ADVANCE DIRECTIVE IP CONSULT  PSYCHIATRY IP CONSULT  CARE MANAGEMENT / SOCIAL WORK IP CONSULT  INFECTIOUS DISEASES IP CONSULT  CHEMICAL DEPENDENCY IP CONSULT  SOCIAL WORK IP CONSULT    Time Spent on this Encounter   IFlavia DO, personally saw the patient today and spent greater than 30 minutes discharging this patient.    Discharge Orders   No discharge procedures on file.  Discharge Medications   Discharge Medication List as of 2021 12:51 PM      CONTINUE these medications which have NOT CHANGED    Details   buprenorphine HCl-naloxone HCl (SUBOXONE) 2-0.5 MG per film Place 0.25-1 Film under the tongue 2 times daily, Historical      clonazePAM (KLONOPIN) 1 MG tablet Take 1 mg by mouth 3 times daily as needed, Historical      diazepam (VALIUM) 5 MG tablet Take 5 mg by mouth 3 times daily, Historical      NONFORMULARY Multiple OTC Herbal supplements including: Valerian, blend for adrenal gland function, Historical           Allergies   No Known Allergies  Data   Most Recent 3 CBC's:Recent Labs   Lab Test 21  1152 21  0505 21  0432   WBC 8.8 15.8* 20.5*   HGB 11.4* 10.7* 12.0*   MCV 83 84 82   PLT 94* 77* 111*      Most Recent 3 BMP's:  Recent Labs   Lab Test 21  1152 21  0217 21  0505 21  0432     --  144 138   POTASSIUM 3.5  --  3.8 3.9   CHLORIDE 113*  --  117* 108   CO2 31  --  26 23   BUN 7  --  8 16   CR 0.89  --  0.78 1.11   ANIONGAP <1*  --  1* 7   SUSAN 8.5  --  7.9* 7.4*   GLC 97 82 119* 114*     Most Recent 2 LFT's:  Recent Labs   Lab Test  07/18/21  0432 11/06/20  1809   AST 39 26   ALT 44 24   ALKPHOS 86 87   BILITOTAL 1.7* 1.6*     Most Recent INR's and Anticoagulation Dosing History:  Anticoagulation Dose History     Recent Dosing and Labs Latest Ref Rng & Units 11/6/2020    INR 0.86 - 1.14 1.05        Most Recent 3 Troponin's:No lab results found.  Most Recent Cholesterol Panel:No lab results found.  Most Recent 6 Bacteria Isolates From Any Culture (See EPIC Reports for Culture Details):No lab results found.  Most Recent TSH, T4 and A1c Labs:  Recent Labs   Lab Test 07/29/14  1309   TSH 0.95

## 2021-08-09 LAB
6MAM UR QL SCN: NORMAL NG/ML
AMPHETAMINES UR QL SCN: NORMAL NG/ML
BARBITURATES UR QL SCN: NORMAL NG/ML
BENZODIAZ UR QL SCN: NORMAL NG/ML
CANNABINOIDS CTO UR CFM-MCNC: NORMAL NG/ML
COCAINE UR QL SCN: NORMAL NG/ML
METHADONE UR QL SCN: NORMAL NG/ML
METHADONE UR QL SCN: NORMAL NG/ML
OXYCODONE+OXYMORPHONE UR QL SCN: NORMAL

## 2023-10-30 ENCOUNTER — HOSPITAL ENCOUNTER (EMERGENCY)
Facility: CLINIC | Age: 28
Discharge: HOME | End: 2023-10-30
Attending: EMERGENCY MEDICINE | Admitting: EMERGENCY MEDICINE
Payer: MEDICARE

## 2023-10-30 VITALS
DIASTOLIC BLOOD PRESSURE: 94 MMHG | SYSTOLIC BLOOD PRESSURE: 140 MMHG | HEART RATE: 104 BPM | OXYGEN SATURATION: 96 % | RESPIRATION RATE: 22 BRPM | TEMPERATURE: 97.9 F

## 2023-10-30 DIAGNOSIS — F41.9 ANXIETY: ICD-10-CM

## 2023-10-30 PROBLEM — F43.10 POSTTRAUMATIC STRESS DISORDER: Status: ACTIVE | Noted: 2023-10-30

## 2023-10-30 PROCEDURE — 99283 EMERGENCY DEPT VISIT LOW MDM: CPT

## 2023-10-30 ASSESSMENT — ACTIVITIES OF DAILY LIVING (ADL)
ADLS_ACUITY_SCORE: 35
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
## 2023-10-30 NOTE — DISCHARGE INSTRUCTIONS
"Aftercare Plan  If I am feeling unsafe or I am in a crisis, I will:   Contact my established care providers   Call the National Suicide Prevention Lifeline: 988  Go to the nearest emergency room   Call 911     Warning signs that I or other people might notice when a crisis is developing for me:   Feeling stressed  Using substances  Not having a consistent place to stay  Unemployment    Things I am able to do on my own to cope or help me feel better:   Maintain a routine  Remain law abiding and follow conditions of probation  Go for a walk  Attend a recovery support meeting if triggered to use  Practice good sleep hygiene     Things that I am able to do with others to cope or help me better:   Go for a walk  Exercise  Talk to a friend about how you are feeling  Go to a recovery support meeting     Things I can use or do for distraction:   Talk to a sponsor  Listen to music  Watch TV  Go for a walk     Changes I can make to support my mental health and wellness:   Attend Therapy  Take medications as prescribed  Attend housing appointment November 1st     People in my life that I can ask for help:   Zaira Almonte    Critical access hospital has a mental health crisis team you can call 24/7: [facility] Mobile Crisis  [phone]     Crisis Lines  Crisis Text Line  Text 272109  You will be connected with a trained live crisis counselor to provide support.  Por espanol, texto  LAYLA a 181049 o texto a 442-AYUDAME en WhatsApp  The Rikki Project (LGBTQ Youth Crisis Line)  0.118.667.2490  text START to 072-025    Community Resources  Fast Tracker  Linking people to mental health and substance use disorder resources  Intervention InsightstrackPongo Resumen.org   [place] Mental Health Warm Line  Peer to peer support  Monday thru Saturday, 12 pm to 10 pm  662.823.7461 or 4.608.391.9261  Text \"Support\" to 33925  National [place] on Mental Illness (ANGEL)  363.613.8794 or 1.888.ANGEL.HELPS    Mental Health Apps  My3  " https://my3app.org/  VirtualHopeBox  https://Third Solutions/apps/virtual-hope-box/    Additional Information  Today you were seen by a licensed mental health professional through Triage and Transition services, [facility] ([facility])  for a crisis assessment in the Emergency Department at [facility].  It is recommended that you follow up with your established providers (psychiatrist, mental health therapist, and/or primary care doctor - as relevant) as soon as possible. Coordinators from [facility] will be calling you in the next 24-48 hours to ensure that you have the resources you need.  You can also contact [facility] coordinators directly at [phone]. You may have been scheduled for or offered an appointment with a mental health provider. [facility] maintains an extensive network of licensed behavioral health providers to connect patients with the services they need.  We do not charge providers a fee to participate in our referral network.  We match patients with providers based on a patient's specific needs, insurance coverage, and location.  Our first effort will be to refer you to a provider within your care system, and will utilize providers outside your care system as needed.      Substance Use Disorder Direct Access Resources    It is recommended that you abstain from all mood altering chemicals. Please contact the sober support hotline (609-785-6170) as needed; phones are answered 24 hours a day, 7 days a week.    To access substance use treatment you must have a comprehensive assessment completed to begin any treatment program.     If uninsured, please contact your county of residence for eligibility screen to substance use disorder evaluation and treatment:    [place] - [phone]   [place] - [phone]   [facility] [phone]   [place] - [phone]   [facility] [phone]   [place] - [phone]   [facility] [phone]   [place] - [phone]     If you have private insurance, call the  customer service number on the back of your insurance card to find an in-network substance abuse use disorder assessment. The ideal provider will be a treatment facility, licensed in the state of [place].     Community GEMMA Evaluations: Clients may call their county for a full list of providers - Availability and services listed belo are subject to change, please call the provider to confirm    [facility]  [phone]  [facility] [place], [place], [address]  *Please call the above number to schedule a comprehensive assessment for determination of level of care needs. In person and virtual appointments available Mon-Fri.    [facility], [facility] [address] Floor, [address], [place] [address] (next to the outpatient lab)    Phone: [phone]   Provides bridging services to people with Opiate Use Disorders (OUD) seeking care. This is a front door to Medication Assisted Treatments (MAT), ages 16+  Walk In hours: Monday-Friday 9:00am-3:00pm    [facility]  [phone]  Walk in Assessments: Mon-Friday 7a-1:45p  [address], [place], [address]    [facility]  Central Access [phone]  [address] [place] [address], [address]  *by appointment only    [facility]  [phone] (phone consultation available )  Locations in: [place], [place], [facility], and [place], [place]  Maori virtual IOP [phone] or visit Rachel.[email or web address]/ENZO   Also offers LGBTQ programming     [facility]  [phone] [address]  [place], [place], [address]  *Currently only offered via telehealth - call to set up an appointment    [facility]  [address] [place], [place], [address]  [facility]  For more information to to make a referral call:  [phone]  Walk-in on   9-11 a.m.    [facility]  [phone]  [address] [place] [address]  [place], [place], [address]  *available by appointments only    [facility] [place] [facility] specific  [phone]  [address] [place], [place], [address]  *available by appointment only    [facility]  [phone]  [address] [place], [place], [address]  *walk in assessments available M-F starting at 7 am.    [facility]  [phone]  Locations: [facility], [facility], and [place]  *Walk in assessments availble M-F starting at 8 am -virtual only    [facility]  [phone]  [address] [place] [address]    [facility]  Virtual + Locations: [place], [place], [place], [place], [facility], [facility], [place], [place]   [phone]  *available by appointment only    [facility]  [phone]  [address]  [place], [place], [address]    [facility]  [phone]  [address] [place], [place], [address]  *available by appointment    [facility]  [phone]  [address] [place], [place], [address]  *walk ins available M-TH from 9-3    [facility] program: [phone]  [address] [place], [place], [address]    [place]  [phone]  Same day substance use disorder assessments are available Monday - Friday, via walk-in or by appointment at the [place] location.  [address], [address], [place] [address]     [facility] - adolescent and adult SUDs services  [phone]  Offer services Monday through Friday, as well as evening hours Monday through Thursday. Normally, a first appointment will be scheduled within one week  [email or web address]-treatment  Locations all over [place]    If you are intoxicated, you may be required to detox at a detox facility before starting treatment. The following are detox facilities that you can self present to. All detox facilities are able to help you complete an assessment prior  to discharge if you choose:    [facility]: Arrive at a [facility] for immediate medical evaluation    [facility]: [address] [place], [place], [address].         [phone]    [facility]: [address] [place], [place], [address]  [phone]     [facility] ([facility]): [address] [place], [place], [address] [phone]     [facility]: [address] [place] [place], [address], [phone]    Ways to help cope with sobriety:  -- Take prescribed medicines as scheduled  -- Keep follow-up appointments  -- Talk to others about your concerns  -- Get regular exercise  -- Practice deep breathing skills  -- Eat a healthy diet  -- Use community resources, including hotline numbers, [facility] crisis and support meetings  -- Stay sober and avoid places/people/things associated with substance use  --Maintain a daily schedule/routine  --Get at least 7-8 hours of sleep per night  --Create a list 10--20 healthy activities that you can do that are enjoyable and do not involve substance use  --Create daily goals (approx. 1-4 goals) per day and work to achieve them throughout the day.    Free Resources:  [facility] ([facility])  [facility] connects people seeking recovery to resources that help foster and sustain long-term recovery. Whether you are seeking resources for treatment, transportation, housing, job training, education, health care or other pathways to recovery, [facility] is a great place to start.  Phone: 363.710.9527. www.[place]House Party.org (Great listing of all types of recovery and non-recovery related resources)    Alcoholics Anonymous  Phone: 4-824-ALCOHOL  Website: HTTP://WWW.AA.ORG/  [employer] (329-736-5061 or http://aamin[place].org)  AA [place] (512-088-3207 or www.aastpaul.org)     Narcotics Anonymous  Phone: 203.486.8989  Website: www.VIOlife.[employer].    [facility]  [address] [place] [address], [address], [facility]  [place],  Phone: [phone]  Drop-in Hours: Monday-Friday 9-11:30 am. By appointment at other times.  Provides: [facility] is a drop-in center on the [facility] that provides a safe space for individuals who are homeless and have a history of chemical use. Sobriety is not a requirement but drugs and alcohol are not allowed on the property.  Services: Non-clients can access drop-in services such as Recovery and Harm Reduction Groups, referrals to case management, community activities, shower facilities, and a pool table. Individuals who are homeless and have chemical health needs may be eligible for enrollment into [facility]'s case management program. Clients and  work together to access benefits, treatment, health care, shelter, and external housing resources.

## 2023-10-30 NOTE — ED NOTES
"Patient refused VS.  GCS. AVS provided and went through with patient,patient to upset about being discharge to teach back. Pt becoming upset that we didn't give him his home meds and that we were \"kicking him out with no ride\" bus tokens provided.  out. Discharge home.   "

## 2023-10-30 NOTE — ED NOTES
"Very difficult to get full story from patient. Pt stating \"I am having a panic attack.\" Patient very restless, crying, emotionally labile, tachycardic and hyperventilating. Difficult to understand what patient is saying through sobbing. Patient stated that his friend \"Od'd because he know about a corrupt  who is trying to put him away.\" Patient also states that his friend stopped breathing so pt was \"breathing for him\" unable to make out what happened to friend otherwise. When asked if patient taking any medications or illicit drugs, patient states \"only prescription meds\" but unable to make out what medications he has taken r/rt sobbing. Patient frequently coming out of room in visible emotional distress. Did tell another staff member that he \"takes ketamine every day\" but have hasn't taken it for a few days because he has been staying at his friend's house. States he has a prescription for the ketamine.   "
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
## 2023-10-30 NOTE — CONSULTS
Diagnostic Evaluation Consultation  Crisis Assessment    Patient Name: Jose Yoder  Age:  28 year old  Legal Sex: male  Gender Identity: male  Pronouns:   Race: White  Ethnicity: Not  or   Language: English    Patient was assessed: Virtual: CRAZE Crisis Assessment Start Time: 1111 Crisis Assessment Stop Time: 1149  Patient location: [facility] EMERGENCY DEPT                             ED27    Referral Data and Chief Complaint  Jose Yoder presents to the ED by  self. Patient is presenting to the ED for the following concerns: Verbal agitation, Anxiety, Depression.   Factors that make the mental health crisis life threatening or complex are:  Pt states his friend overdosed and so pt drove to ED on his motorcycle to be with his friend. Pt can't leave because he locked his motorcycle up. Pt states he was in a panic because police kicked the pt out of his friends home because the owners were not there at the time. Pt has several things of his including his motorcycle keys and his prescription medications. Pt states he has been staying with a friend for the past 3 days because his friend wasn't doing very well. Pt states his friend overdosed so pt called the police. Pt states he is sober and states he is sad his friend almost . Pt states he is on probation with [facility]. Pt reports all of this causes him to feel triggered with his PTSD and has panic attacks as a result. Pt denied any SI/HI or NSSI. Pt is calm yet talkative and then tearful and saying he is tired.    Informed Consent and Assessment Methods  Explained the crisis assessment process, including applicable information disclosures and limits to confidentiality, assessed understanding of the process, and obtained consent to proceed with the assessment.  Assessment methods included conducting a formal interview with patient, review of medical records, collaboration with medical staff, and obtaining relevant  collateral information from family and community providers when available.  : done     Patient response to interventions: verbalizes understanding, acceptance expressed  Coping skills were attempted to reduce the crisis:  Therapy, medication management.     History of the Crisis   Pt states he has anxiety, depression and PTSD. Pt did EMDR for 1 session and then stopped going, Ketamine therapy, working out, individual therapy (appt tomorrow), has psychiatry and reports he just had a recent appointment. Pt states he is on probation and checks in with his  weekly. Pt also states he has been approved for housing and meets with a housing worker November 1st.    Brief Psychosocial History  Family:  Single, Children no (Unknown if pt has children)  Support System:  Limited to, Other (specify), Parent(s) (Micah)  Employment Status:  disabled  Source of Income:  disability  Financial Environmental Concerns:  unemployed  Current Hobbies:  exercise/fitness, music, television/movies/videos  Barriers in Personal Life:  lack of motivation, financial concerns    Significant Clinical History  Current Anxiety Symptoms:  anxious, racing thoughts  Current Depression/Trauma:  sadness  Current Somatic Symptoms:  racing thoughts, excessive worry, anxious  Current Psychosis/Thought Disturbance:  agitation, distractability  Current Eating Symptoms:   (denied)  Chemical Use History:  Alcohol: None  Benzodiazepines: None  Opiates: None  Cocaine: None  Marijuana: Daily (medical marijuana)  Last Use:: 10/30/23  Other Use: None  Withdrawal Symptoms:  (denied)  Addictions:  (pt denied)   Past diagnosis:  Anxiety Disorder, PTSD, Depression, Substance Use Disorder  Family history:  Personality Disorder, Substance Use Disorder, Depression, Anxiety Disorder, Bipolar Disorder  Past treatment:  Individual therapy, Psychiatric Medication Management, Probation/Court ordered treatment  Details of most recent treatment:  Therapy  and psychiatry currently. Pt has done EMDR, TMS, Ketamine treatment, medical marijuana.     Collateral Information  Is there collateral information: No (No collateral contact information in medical chart and pt refused to provide number for collateral.) ED reports someone dropped off pizza for the pt earlier today.      Risk Assessment  Ansted Suicide Severity Rating Scale Full Clinical Version:  Suicidal Ideation  Q1 Wish to be Dead (Lifetime): No  Q2 Non-Specific Active Suicidal Thoughts (Lifetime): No     Suicidal Behavior (Lifetime)  Actual Attempt (Lifetime): No  Has subject engaged in non-suicidal self-injurious behavior? (Lifetime): No  Interrupted Attempts (Lifetime): No  Aborted or Self-Interrupted Attempt (Lifetime): No  Preparatory Acts or Behavior (Lifetime): No    Ansted Suicide Severity Rating Scale Recent:   Suicidal Ideation (Recent)  Q1 Wished to be Dead (Past Month): no  Q2 Suicidal Thoughts (Past Month): no  Intensity of Ideation (Recent)  Most Severe Ideation Rating (Past 1 Month):  (none reported)  Frequency (Past 1 Month):  (none reported)  Duration (Past 1 Month):  (none reported)  Controllability (Past 1 Month):  (does not report)  Deterrents (Past 1 Month): Does not apply  Reasons for Ideation (Past 1 Month): Does not apply  Suicidal Behavior (Recent)  Actual Attempt (Past 3 Months): No  Total Number of Actual Attempts (Past 3 Months): 0  Has subject engaged in non-suicidal self-injurious behavior? (Past 3 Months): No  Interrupted Attempts (Past 3 Months): No  Total Number of Interrupted Attempts (Past 3 Months): 0  Aborted or Self-Interrupted Attempt (Past 3 Months): No  Total Number of Aborted or Self-Interrupted Attempts (Past 3 Months): 0  Preparatory Acts or Behavior (Past 3 Months): No  Total Number of Preparatory Acts (Past 3 Months): 0    Environmental or Psychosocial Events: legal issues such as DWI, DUI, lawsuit, CPS involvement, etc., history of TBI, other life stressors,  impulsivity/recklessness, challenging interpersonal relationships  Protective Factors: Protective Factors: strong bond to family unit, community support, or employment, lives in a responsibly safe and stable environment, sense of importance of health and wellness, help seeking, supportive ongoing medical and mental health care relationships.    Does the patient have thoughts of harming others? Feels Like Hurting Others: no  Previous Attempt to Hurt Others: no  Is the patient engaging in sexually inappropriate behavior?: no    Is the patient engaging in sexually inappropriate behavior?  no        Mental Status Exam   Affect: Other (anxious)  Appearance: Disheveled  Attention Span/Concentration: Attentive  Eye Contact: Engaged    Fund of Knowledge: Delayed   Language /Speech Content: Fluent  Language /Speech Volume: Normal  Language /Speech Rate/Productions: Pressured, Minimally Responsive  Recent Memory: Poor  Remote Memory: Poor  Mood: Anxious, Irritable  Orientation to Person: Yes   Orientation to Place: Yes  Orientation to Time of Day: Yes  Orientation to Date: Yes     Situation (Do they understand why they are here?): Yes  Psychomotor Behavior: Agitated  Thought Content: Clear  Thought Form: Tangential, Other (please comment) (directable)      Medication  Psychotropic medications: Unable to verfiy through medical records what pt takes.      Current Care Team  Patient Care Team:  Ana Connor MD as PCP - General (Family Practice)    Diagnosis  Patient Active Problem List   Diagnosis Code    Psychoactive substance-induced organic mood disorder (H) F19.94    Polysubstance abuse (H) F19.10    Cannabis dependence (H) F12.20    Conduct disorder, adolescent onset type F91.2    Combinations of drug dependence excluding opioid type drug, abuse (H) F19.20    Suicide attempt (H) T14.91XA    Mental and behavioral problem F48.9, F69    Suicide attempt by drug ingestion (H) T50.902A    Depression with suicidal ideation  F32.A, R45.851    Elevated CK R74.8    Screen for STD (sexually transmitted disease) Z11.3    Opioid overdose (H) T40.2X1A    Posttraumatic stress disorder F43.10    Anxiety F41.9     Primary Problem This Admission  Active Hospital Problems  F43.1  Posttraumatic stress disorder    F41.9 Anxiety    Clinical Summary and Substantiation of Recommendations   After therapeutic assessment, intervention and aftercare planning by ED care team and LM and in consultation with attending provider, the patient's circumstances and mental state were appropriate for outpatient management. It is the recommendation of this clinician that pt discharge with OP  support. At this time the pt is not presenting as an acute risk to self or others due to the following factors: Pt denied any active or current SI/HI/NSSI. Pt reports he is on probation with [facility] and meets with them weekly. Pt has a therapist and psychiatrist whom he will continue to meet with. Pt also has a housing appointment set for Wednesday.    Patient coping skills attempted to reduce the crisis:  Therapy, medication management.    Disposition  Recommended disposition: Individual Therapy, Medication Management        Reviewed case and recommendations with attending provider. Attending Name: Dr Spears       Attending concurs with disposition: yes       Patient and/or validated legal guardian concurs with disposition:   yes       Final disposition:  discharge    Legal status on admission:      Assessment Details   Total duration spent with the patient: 38 min     CPT code(s) utilized: 96288 - Psychotherapy for Crisis - 60 (30-74*) min    Shannon Mart Stephens [facility] HospitalMIGUEL A, Psychotherapist  DEC - Triage & Transition Services  Callback: [phone]

## 2024-04-26 ENCOUNTER — HOSPITAL ENCOUNTER (EMERGENCY)
Facility: CLINIC | Age: 29
Discharge: LEFT BEFORE DISCHARGE | End: 2024-04-26
Attending: EMERGENCY MEDICINE | Admitting: EMERGENCY MEDICINE
Payer: MEDICARE

## 2024-04-26 VITALS
SYSTOLIC BLOOD PRESSURE: 119 MMHG | DIASTOLIC BLOOD PRESSURE: 72 MMHG | HEIGHT: 71 IN | OXYGEN SATURATION: 100 % | RESPIRATION RATE: 18 BRPM | WEIGHT: 160 LBS | BODY MASS INDEX: 22.4 KG/M2 | HEART RATE: 75 BPM | TEMPERATURE: 98 F

## 2024-04-26 DIAGNOSIS — F13.20 BENZODIAZEPINE DEPENDENCE (H): ICD-10-CM

## 2024-04-26 DIAGNOSIS — F41.1 GENERALIZED ANXIETY DISORDER: ICD-10-CM

## 2024-04-26 LAB
ALBUMIN SERPL BCG-MCNC: 4.8 G/DL (ref 3.5–5.2)
ALP SERPL-CCNC: 94 U/L (ref 40–150)
ALT SERPL W P-5'-P-CCNC: 17 U/L (ref 0–70)
ANION GAP SERPL CALCULATED.3IONS-SCNC: 8 MMOL/L (ref 7–15)
AST SERPL W P-5'-P-CCNC: 23 U/L (ref 0–45)
BASOPHILS # BLD AUTO: 0 10E3/UL (ref 0–0.2)
BASOPHILS NFR BLD AUTO: 0 %
BILIRUB DIRECT SERPL-MCNC: <0.2 MG/DL (ref 0–0.3)
BILIRUB SERPL-MCNC: 1.2 MG/DL
BUN SERPL-MCNC: 9.1 MG/DL (ref 6–20)
CALCIUM SERPL-MCNC: 9.4 MG/DL (ref 8.6–10)
CHLORIDE SERPL-SCNC: 97 MMOL/L (ref 98–107)
CREAT SERPL-MCNC: 0.94 MG/DL (ref 0.67–1.17)
DEPRECATED HCO3 PLAS-SCNC: 31 MMOL/L (ref 22–29)
EGFRCR SERPLBLD CKD-EPI 2021: >90 ML/MIN/1.73M2
EOSINOPHIL # BLD AUTO: 0.1 10E3/UL (ref 0–0.7)
EOSINOPHIL NFR BLD AUTO: 1 %
ERYTHROCYTE [DISTWIDTH] IN BLOOD BY AUTOMATED COUNT: 12.3 % (ref 10–15)
GLUCOSE SERPL-MCNC: 74 MG/DL (ref 70–99)
HCT VFR BLD AUTO: 43.1 % (ref 40–53)
HGB BLD-MCNC: 14.4 G/DL (ref 13.3–17.7)
IMM GRANULOCYTES # BLD: 0 10E3/UL
IMM GRANULOCYTES NFR BLD: 0 %
LYMPHOCYTES # BLD AUTO: 2.7 10E3/UL (ref 0.8–5.3)
LYMPHOCYTES NFR BLD AUTO: 39 %
MCH RBC QN AUTO: 28.6 PG (ref 26.5–33)
MCHC RBC AUTO-ENTMCNC: 33.4 G/DL (ref 31.5–36.5)
MCV RBC AUTO: 86 FL (ref 78–100)
MONOCYTES # BLD AUTO: 0.5 10E3/UL (ref 0–1.3)
MONOCYTES NFR BLD AUTO: 7 %
NEUTROPHILS # BLD AUTO: 3.7 10E3/UL (ref 1.6–8.3)
NEUTROPHILS NFR BLD AUTO: 53 %
NRBC # BLD AUTO: 0 10E3/UL
NRBC BLD AUTO-RTO: 0 /100
PLATELET # BLD AUTO: 211 10E3/UL (ref 150–450)
POTASSIUM SERPL-SCNC: 3.7 MMOL/L (ref 3.4–5.3)
PROT SERPL-MCNC: 7.4 G/DL (ref 6.4–8.3)
RBC # BLD AUTO: 5.04 10E6/UL (ref 4.4–5.9)
SODIUM SERPL-SCNC: 136 MMOL/L (ref 135–145)
WBC # BLD AUTO: 7 10E3/UL (ref 4–11)

## 2024-04-26 PROCEDURE — 250N000011 HC RX IP 250 OP 636: Performed by: EMERGENCY MEDICINE

## 2024-04-26 PROCEDURE — 250N000013 HC RX MED GY IP 250 OP 250 PS 637: Performed by: EMERGENCY MEDICINE

## 2024-04-26 PROCEDURE — 96374 THER/PROPH/DIAG INJ IV PUSH: CPT

## 2024-04-26 PROCEDURE — 85025 COMPLETE CBC W/AUTO DIFF WBC: CPT | Performed by: EMERGENCY MEDICINE

## 2024-04-26 PROCEDURE — 80048 BASIC METABOLIC PNL TOTAL CA: CPT | Performed by: EMERGENCY MEDICINE

## 2024-04-26 PROCEDURE — 99284 EMERGENCY DEPT VISIT MOD MDM: CPT | Mod: 25

## 2024-04-26 PROCEDURE — 93005 ELECTROCARDIOGRAM TRACING: CPT | Mod: RTG

## 2024-04-26 PROCEDURE — 36415 COLL VENOUS BLD VENIPUNCTURE: CPT | Performed by: EMERGENCY MEDICINE

## 2024-04-26 PROCEDURE — 84155 ASSAY OF PROTEIN SERUM: CPT | Performed by: EMERGENCY MEDICINE

## 2024-04-26 PROCEDURE — 82247 BILIRUBIN TOTAL: CPT | Performed by: EMERGENCY MEDICINE

## 2024-04-26 RX ORDER — IBUPROFEN 600 MG/1
600 TABLET, FILM COATED ORAL ONCE
Status: COMPLETED | OUTPATIENT
Start: 2024-04-26 | End: 2024-04-26

## 2024-04-26 RX ORDER — ONDANSETRON 2 MG/ML
4 INJECTION INTRAMUSCULAR; INTRAVENOUS EVERY 6 HOURS PRN
Status: DISCONTINUED | OUTPATIENT
Start: 2024-04-26 | End: 2024-04-26 | Stop reason: HOSPADM

## 2024-04-26 RX ADMIN — ONDANSETRON 4 MG: 2 INJECTION INTRAMUSCULAR; INTRAVENOUS at 15:40

## 2024-04-26 RX ADMIN — IBUPROFEN 600 MG: 600 TABLET ORAL at 15:42

## 2024-04-26 ASSESSMENT — ACTIVITIES OF DAILY LIVING (ADL)
ADLS_ACUITY_SCORE: 37

## 2024-04-26 NOTE — ED NOTES
"DEC talking with pt;  pt grabbed his belongings and abruptly left his room stating, \"how the hell do I get out of here.\"  Pt then left his room and walked towards triage.  MD aware.  "

## 2024-04-26 NOTE — ED TRIAGE NOTES
Pt is withdrawing - nausea, headache  and neck pain   Pt brought down his clonzapaem and is wanting to lower dose   Pt has ptsd and anxiety

## 2024-04-26 NOTE — ED PROVIDER NOTES
"  History     Chief Complaint:  Withdrawal       The history is provided by the patient.     Jose Yoder is a 29 year old male who presents with withdrawals. Patient has history of anxiety and substance abuse. He is in the emergency department for a refill of his clonazepam medication. He says he was previously on 3 mg daily but has been taking only 0.5 mg for the past 1.5 weeks. Patient says his psychiatrist told him he would not see him anymore as he has missed multiple appointments and he is not able to get he medication refilled anymore. He was previously on ketamine as well but was prescribed Suboxone and was told he was not able to take clonazepam and ketamine while on Suboxone so now patient is only consistently on methadone which he took today.     Independent Historian:   None - Patient Only    Review of External Notes:   Yes-I reviewed the patient's telemedicine/ED visits with his family medicine doctor on 4/22 and 4/23 and reviewed his urgent care visit from earlier today.    Medications:    Baclofen   Clonidine   Methadone   Clonazepam    Past Medical History:    Opioid overdose  Suicide attempt   Heroine use  Psychoactive substance-induced organic mood disorder   Chemical dependency   Hemorrhage from tracheostomy stoma   Seizures  TBI    Past Surgical History:    Tonsil and adenoidectomy     Physical Exam   Patient Vitals for the past 24 hrs:   BP Temp Temp src Pulse Resp SpO2 Height Weight   04/26/24 1531 119/72 -- -- 75 18 100 % -- --   04/26/24 1224 139/75 98  F (36.7  C) Oral (!) 126 16 99 % 1.803 m (5' 11\") 72.6 kg (160 lb)      Physical Exam  Eye:  Pupils are equal, round, and reactive.  Extraocular movements intact.    ENT:  No rhinorrhea.  Moist mucus membranes.  Normal tongue and tonsil.    Cardiac:  Regular rate and rhythm.  No murmurs, gallops, or rubs.    Pulmonary:  Clear to auscultation bilaterally.  No wheezes, rales, or rhonchi.    Abdomen:  Positive bowel sounds.  Abdomen is soft " and non-distended, without focal tenderness.    Musculoskeletal:  Normal movement of all extremities without evidence for deficit.    Skin:  Warm and dry without rashes.    Neurologic:  Non-focal exam without asymmetric weakness or numbness.     Psychiatric:  Normal affect with appropriate interaction with examiner. Patient appears anxious but has appropriate eye contact and reasonable understanding of his condition.     Emergency Department Course   Emergency Department Course & Assessments:  Interventions:  Medications   ondansetron (ZOFRAN) injection 4 mg (4 mg Intravenous $Given 4/26/24 1540)   ibuprofen (ADVIL/MOTRIN) tablet 600 mg (600 mg Oral $Given 4/26/24 1542)      Assessments:  1540 I obtained history and examined the patient as noted above  1710 I re-evaluated the patient.    Independent Interpretation (X-rays, CTs, rhythm strip):  None    Consultations/Discussion of Management or Tests:        Social Determinants of Health affecting care:   None    Disposition:  The patient was transferred to [facility].     Impression & Plan    Medical Decision Making:  This unfortunate 29-year-old with a history of generalized anxiety disorder and traumatic brain injury, recently controlled on clonazepam (1 mg 3 times a day), ketamine, and opioid abuse managed with methadone, presents to us because of issues with his benzodiazepines.  As noted above, the patient has been seeing the same psychiatrist for some time, but has been released from their care because of frequently missed appointments.  He states that his last 30-day supply of clonazepam fell out of his pocket and his provider at that time agreed to give him 11 tabs, with plans to take 1 tab a day for a week and then a half a tab a day moving forward.  Patient states that this has been challenging, both from a withdrawal and an anxiety standpoint.  However, he is now nearing the end of that taper.  He spoke with his primary care doctor who would not prescribe him  "benzos.  He saw a new psychiatrist earlier today who refused to prescribe him further benzodiazepines.  He then went to an urgent care where they sent him here for further assessment.    At the time of my evaluation in the waiting area, the patient is fairly calm.  His vital signs are normal.  He does not show any overt signs of serious benzodiazepine withdrawal.  Laboratory investigation was pursued which is all normal.  With this, I felt that he would be a good candidate to be seen in our EMPATH unit where he could then work with therapy and psychiatry to discuss a plan moving forward of how to treat his anxiety.  I did warn him that I thought it would be unlikely that he would find a psychiatrist who would be willing to reinitiate high-dose benzodiazepines for chronic anxiety.    When the patient was evaluated by my DEC team, they had a difficult conversation.  It sounds as though the patient did not want to go to the EMPATH unit because it is a \"locked unit and this will trigger me.\"  It was recommended that he consider going to detox or further meds could be discussed at that point.  The patient does not want to go to detox, simply wanting to be started on something for his anxiety.  I went back and spoke with the patient, and through our discussion, he does understand that he needs to go to the EMPATH unit to be able to be seen by psychiatry and at my last discussion, he was open to this.  I asked my DEC team to run it by the psychiatrist, and I was told that he would likely be transferred over.  However, the patient began to become further escalated and his frustration that we will not give him medications that he eventually picked up his things and simply left.  I did not have an opportunity to speak with him further.  He did not receive any discharge paperwork.  He was urged to follow-up with his prior therapy and psychiatry team when I last spoke with him, recommending him to return to the ER at any point " for worsening of condition.      Diagnosis:    ICD-10-CM    1. Generalized anxiety disorder  F41.1       2. Benzodiazepine dependence (H)  F13.20            Scribe Disclosure:  I, Marissa Tata, am serving as a scribe at 5:10 PM on 4/26/2024 to document services personally performed by Trierweiler, Chad A, MD based on my observations and the provider's statements to me.     4/26/2024   Trierweiler, Chad A, MD Trierweiler, Chad A, MD  04/26/24 1932

## 2024-04-27 LAB
ATRIAL RATE - MUSE: 118 BPM
DIASTOLIC BLOOD PRESSURE - MUSE: NORMAL MMHG
INTERPRETATION ECG - MUSE: NORMAL
P AXIS - MUSE: 79 DEGREES
PR INTERVAL - MUSE: 184 MS
QRS DURATION - MUSE: 90 MS
QT - MUSE: 300 MS
QTC - MUSE: 420 MS
R AXIS - MUSE: 85 DEGREES
SYSTOLIC BLOOD PRESSURE - MUSE: NORMAL MMHG
T AXIS - MUSE: -14 DEGREES
VENTRICULAR RATE- MUSE: 118 BPM

## 2024-04-27 NOTE — CONSULTS
"Patient presented to the ED voluntarily seeking clonazepam refills and to speak to a psychiatrist. He refused to participate in a DEC assessment, stating he only wants medications. Writer met with pt to discuss detox versus [facility]. Pt expressed frustration, stating that today, he has already gone to his psychiatrist, PCP, urgent care, and now the ED seeking help. He stated that he doesn't want detox or to be hospitalized because he wants to be able to smoke marijuana, as that is his main coping mechanism. Pt appeared to be highly anxious with low distress tolerance. He got up several times stating he was \"just going to leave then\" if he couldn't get his medication refilled or speak with a psychiatrist. Pt was informed that if he were to come to come to [facility], he would not be seen right away by the provider as there are several patients before him; also that he would likely be started on a medication that is not a benzodiazepine. At that point, pt buckled his boot straps, aggressively grabbed his belongings including his motorcycle jacket and helmet, and walked out of the door, stating \"You guys clearly don't care about me.\"   "